# Patient Record
Sex: FEMALE | Race: WHITE | NOT HISPANIC OR LATINO | Employment: OTHER | URBAN - METROPOLITAN AREA
[De-identification: names, ages, dates, MRNs, and addresses within clinical notes are randomized per-mention and may not be internally consistent; named-entity substitution may affect disease eponyms.]

---

## 2017-01-09 ENCOUNTER — TELEPHONE (OUTPATIENT)
Dept: RHEUMATOLOGY | Facility: CLINIC | Age: 67
End: 2017-01-09

## 2017-01-09 NOTE — TELEPHONE ENCOUNTER
----- Message from Bonnie Avila sent at 1/9/2017 10:22 AM CST -----  Contact: [employer]  Calling to clarify pt script, Cosentyx, please call [phone].

## 2017-01-17 ENCOUNTER — PATIENT MESSAGE (OUTPATIENT)
Dept: RHEUMATOLOGY | Facility: CLINIC | Age: 67
End: 2017-01-17

## 2017-03-03 ENCOUNTER — TELEPHONE (OUTPATIENT)
Dept: RHEUMATOLOGY | Facility: CLINIC | Age: 67
End: 2017-03-03

## 2017-03-03 NOTE — TELEPHONE ENCOUNTER
----- Message from Immanuel Alcantara sent at 3/3/2017 11:22 AM CST -----  Contact: [facility] [employer] -[phone] ID#[id number]  Would like to verify prior authorization on Rx medication, please contact [employer] @ [phone].  Please call back @[phone] ID#[id number].  Thanks-[facility]

## 2017-03-22 ENCOUNTER — TELEPHONE (OUTPATIENT)
Dept: RHEUMATOLOGY | Facility: CLINIC | Age: 67
End: 2017-03-22

## 2017-03-22 NOTE — TELEPHONE ENCOUNTER
Spoke with Ms. Luis who states she received a shipment of her Cosentyx from [employer]. Follow up scheduled for 04/04/2017 with Zhanna Rollins PA-C. Reminder letter mailed.

## 2017-04-03 ENCOUNTER — TELEPHONE (OUTPATIENT)
Dept: RHEUMATOLOGY | Facility: CLINIC | Age: 67
End: 2017-04-03

## 2017-04-03 NOTE — TELEPHONE ENCOUNTER
----- Message from Margarette Abdalla sent at 4/3/2017 10:14 AM CDT -----  Contact: Pt  Pt is requesting to speak to the nurse regarding her appt that's scheduled on tomorrow. Pls call pt back at [phone] or [phone](hctu).

## 2017-04-06 ENCOUNTER — LAB VISIT (OUTPATIENT)
Dept: LAB | Facility: HOSPITAL | Age: 67
End: 2017-04-06
Attending: INTERNAL MEDICINE
Payer: MEDICARE

## 2017-04-06 ENCOUNTER — OFFICE VISIT (OUTPATIENT)
Dept: RHEUMATOLOGY | Facility: CLINIC | Age: 67
End: 2017-04-06
Payer: MEDICARE

## 2017-04-06 VITALS
DIASTOLIC BLOOD PRESSURE: 82 MMHG | HEART RATE: 90 BPM | WEIGHT: 153.44 LBS | SYSTOLIC BLOOD PRESSURE: 136 MMHG | BODY MASS INDEX: 24.03 KG/M2

## 2017-04-06 DIAGNOSIS — M54.2 NECK PAIN: ICD-10-CM

## 2017-04-06 DIAGNOSIS — Z51.81 MEDICATION MONITORING ENCOUNTER: Chronic | ICD-10-CM

## 2017-04-06 DIAGNOSIS — L40.50 PSORIATIC ARTHRITIS: ICD-10-CM

## 2017-04-06 DIAGNOSIS — L40.9 PSORIASIS: ICD-10-CM

## 2017-04-06 DIAGNOSIS — L40.50 PSORIATIC ARTHRITIS: Primary | ICD-10-CM

## 2017-04-06 DIAGNOSIS — M47.812 SPONDYLOSIS OF CERVICAL REGION WITHOUT MYELOPATHY OR RADICULOPATHY: ICD-10-CM

## 2017-04-06 DIAGNOSIS — E55.9 HYPOVITAMINOSIS D: Chronic | ICD-10-CM

## 2017-04-06 LAB
ALBUMIN SERPL BCP-MCNC: 3.8 G/DL
ALP SERPL-CCNC: 72 U/L
ALT SERPL W/O P-5'-P-CCNC: 23 U/L
ANION GAP SERPL CALC-SCNC: 11 MMOL/L
AST SERPL-CCNC: 27 U/L
BASOPHILS # BLD AUTO: 0.03 K/UL
BASOPHILS NFR BLD: 0.4 %
BILIRUB SERPL-MCNC: 0.3 MG/DL
BUN SERPL-MCNC: 16 MG/DL
CALCIUM SERPL-MCNC: 10 MG/DL
CHLORIDE SERPL-SCNC: 106 MMOL/L
CO2 SERPL-SCNC: 27 MMOL/L
CREAT SERPL-MCNC: 0.8 MG/DL
CRP SERPL-MCNC: 10.9 MG/L
DIFFERENTIAL METHOD: ABNORMAL
EOSINOPHIL # BLD AUTO: 0.1 K/UL
EOSINOPHIL NFR BLD: 2 %
ERYTHROCYTE [DISTWIDTH] IN BLOOD BY AUTOMATED COUNT: 14 %
ERYTHROCYTE [SEDIMENTATION RATE] IN BLOOD BY WESTERGREN METHOD: 10 MM/HR
EST. GFR  (AFRICAN AMERICAN): >60 ML/MIN/1.73 M^2
EST. GFR  (NON AFRICAN AMERICAN): >60 ML/MIN/1.73 M^2
GLUCOSE SERPL-MCNC: 108 MG/DL
HCT VFR BLD AUTO: 40.4 %
HGB BLD-MCNC: 13.3 G/DL
LYMPHOCYTES # BLD AUTO: 1.9 K/UL
LYMPHOCYTES NFR BLD: 27.6 %
MCH RBC QN AUTO: 30.4 PG
MCHC RBC AUTO-ENTMCNC: 32.9 %
MCV RBC AUTO: 92 FL
MONOCYTES # BLD AUTO: 0.3 K/UL
MONOCYTES NFR BLD: 3.6 %
NEUTROPHILS # BLD AUTO: 4.6 K/UL
NEUTROPHILS NFR BLD: 66.4 %
PLATELET # BLD AUTO: 224 K/UL
PMV BLD AUTO: 11.3 FL
POTASSIUM SERPL-SCNC: 4.1 MMOL/L
PROT SERPL-MCNC: 7.4 G/DL
RBC # BLD AUTO: 4.38 M/UL
SODIUM SERPL-SCNC: 144 MMOL/L
WBC # BLD AUTO: 6.91 K/UL

## 2017-04-06 PROCEDURE — 99214 OFFICE O/P EST MOD 30 MIN: CPT | Mod: S$GLB,,, | Performed by: PHYSICIAN ASSISTANT

## 2017-04-06 PROCEDURE — 1160F RVW MEDS BY RX/DR IN RCRD: CPT | Mod: S$GLB,,, | Performed by: PHYSICIAN ASSISTANT

## 2017-04-06 PROCEDURE — 99999 PR PBB SHADOW E&M-EST. PATIENT-LVL III: CPT | Mod: PBBFAC,,, | Performed by: PHYSICIAN ASSISTANT

## 2017-04-06 RX ORDER — METHYLPREDNISOLONE 4 MG/1
TABLET ORAL
Qty: 1 PACKAGE | Refills: 1 | Status: SHIPPED | OUTPATIENT
Start: 2017-04-06 | End: 2017-08-16

## 2017-04-06 ASSESSMENT — CLINICAL DISEASE ACTIVITY INDEX (CDAI)
PHYSICIAN_ASSESSMENT: 1
TOTAL_SCORE: 6
TENDER_JOINTS_COUNT: 2
PATIENT_ASSESSMENT: 1
SWOLLEN_JOINTS_COUNT: 2

## 2017-04-06 NOTE — PROGRESS NOTES
Subjective:       Patient ID: Karin Smith is a 66 y.o. female.    Chief Complaint: Psoriatic Arthritis and Fibromyalgia    HPI Comments: Karin is seen here for Chronic Psoriatic arthritis with skin psoriasis, severe resorptive type.  She has failed  humira, enbrel, remicade, simponi, mtx, stelara, and cimzia. She was  moved to cosentyx 150mg in October and is doing well from a joint and rash standpoint. She has tolerates the Cosentyx well and has no issues.She has a few small spots of psoriasis rash on her scalp mostly today.  She feels like the Cosentyx has done a good job.      Her main complaint today is neck muscle spasms.  Last Friday she somehow pulled a muscle in her neck on the left.  Denies weakness, numbness or tingling. She went to the ER and they did a CT which did not show any acute abnormality. She is taking muscle relaxers and ibuprofen but has not had significant relief.  She was told if she did not improve she may need an MRI. She still can't look over her left shoulder due to pain and stiffness.  She has been using heat and ice.  She has a h/o disc disease in her neck and has had surgery years ago.      History of vit D deficiency, on vit D 50,000Units weekly.         Fibromyalgia   Associated symptoms include arthralgias, joint swelling, neck pain and a rash. Pertinent negatives include no chest pain, coughing, fatigue, fever, headaches, myalgias, nausea, vomiting or weakness.     Review of Systems   Constitutional: Negative for activity change, fatigue, fever and unexpected weight change.   HENT: Negative for mouth sores, tinnitus and trouble swallowing.    Eyes: Negative for itching and visual disturbance.   Respiratory: Negative for cough and shortness of breath.    Cardiovascular: Negative for chest pain, palpitations and leg swelling.   Gastrointestinal: Negative for blood in stool, diarrhea, nausea and vomiting.   Genitourinary: Negative for dysuria, flank pain, frequency and hematuria.    Musculoskeletal: Positive for arthralgias, back pain, joint swelling, neck pain and neck stiffness. Negative for myalgias.   Skin: Positive for rash. Negative for wound.   Neurological: Negative for dizziness, weakness, light-headedness and headaches.   Hematological: Negative for adenopathy. Does not bruise/bleed easily.        Increased urinary frequency   Psychiatric/Behavioral: Negative for confusion and sleep disturbance.         Objective:     /82  Pulse 90  Wt 69.6 kg (153 lb 7 oz)  BMI 24.03 kg/m2     Physical Exam   Constitutional: She is oriented to person, place, and time and well-developed, well-nourished, and in no distress. No distress.   HENT:   Head: Normocephalic.   Eyes: EOM are normal. Pupils are equal, round, and reactive to light.   Neck: Normal range of motion. Neck supple. No thyromegaly present.   Cardiovascular: Normal rate, regular rhythm and normal heart sounds.  Exam reveals no gallop and no friction rub.    No murmur heard.  Pulmonary/Chest: Breath sounds normal. She has no wheezes. She has no rales. She exhibits no tenderness.   Abdominal: Soft. There is no tenderness. There is no rebound.       Right Side Rheumatological Exam     Examination finds the shoulder, elbow, wrist, knee, 1st MCP, 2nd PIP, 2nd MCP, 3rd MCP, 4th PIP, 4th MCP, 5th PIP and 5th MCP normal.    The patient is tender to palpation of the 2nd PIP    She has swelling of the 2nd PIP    The patient has an enlarged 1st PIP and 3rd PIP    Hip Exam   Tenderness Location: no tenderness    Left Side Rheumatological Exam     Examination finds the shoulder, elbow, wrist, knee, 1st MCP, 2nd MCP, 3rd MCP, 4th PIP, 4th MCP, 5th PIP and 5th MCP normal.    The patient is tender to palpation of the 2nd PIP.    She has swelling of the 2nd PIP    The patient has an enlarged 1st PIP.      Lymphadenopathy:     She has no cervical adenopathy.   Neurological: She is alert and oriented to person, place, and time. She displays  normal reflexes. She exhibits normal muscle tone. Gait normal.   Skin: Skin is warm and dry. Rash noted. No erythema.     Few small spots of psoriasis rash at her hairline posterior scalp and neck   Psychiatric: Mood, memory and affect normal.   Musculoskeletal: Normal range of motion. She exhibits no edema or tenderness.   Chronic damage noted to clay pips, dips, with swelling and mild tenderness to right 2 pip and left 2 pip    Dip enlargement to right 1,4,5 digit  Left 1,2,3,5 dip enlargement      Cervical motion sig decreased due to pain, mild tenderness throughout paraspinal musculature on the left side    C5,6,7 strength 5/5           Recent Results (from the past 168 hour(s))   Comprehensive metabolic panel    Collection Time: 04/06/17  2:10 PM   Result Value Ref Range    Sodium 144 136 - 145 mmol/L    Potassium 4.1 3.5 - 5.1 mmol/L    Chloride 106 95 - 110 mmol/L    CO2 27 23 - 29 mmol/L    Glucose 108 70 - 110 mg/dL    BUN, Bld 16 8 - 23 mg/dL    Creatinine 0.8 0.5 - 1.4 mg/dL    Calcium 10.0 8.7 - 10.5 mg/dL    Total Protein 7.4 6.0 - 8.4 g/dL    Albumin 3.8 3.5 - 5.2 g/dL    Total Bilirubin 0.3 0.1 - 1.0 mg/dL    Alkaline Phosphatase 72 55 - 135 U/L    AST 27 10 - 40 U/L    ALT 23 10 - 44 U/L    Anion Gap 11 8 - 16 mmol/L    eGFR if African American >60 >60 mL/min/1.73 m^2    eGFR if non African American >60 >60 mL/min/1.73 m^2   CBC auto differential    Collection Time: 04/06/17  2:10 PM   Result Value Ref Range    WBC 6.91 3.90 - 12.70 K/uL    RBC 4.38 4.00 - 5.40 M/uL    Hemoglobin 13.3 12.0 - 16.0 g/dL    Hematocrit 40.4 37.0 - 48.5 %    MCV 92 82 - 98 fL    MCH 30.4 27.0 - 31.0 pg    MCHC 32.9 32.0 - 36.0 %    RDW 14.0 11.5 - 14.5 %    Platelets 224 150 - 350 K/uL    MPV 11.3 9.2 - 12.9 fL    Gran # 4.6 1.8 - 7.7 K/uL    Lymph # 1.9 1.0 - 4.8 K/uL    Mono # 0.3 0.3 - 1.0 K/uL    Eos # 0.1 0.0 - 0.5 K/uL    Baso # 0.03 0.00 - 0.20 K/uL    Gran% 66.4 38.0 - 73.0 %    Lymph% 27.6 18.0 - 48.0 %    Mono%  3.6 (L) 4.0 - 15.0 %    Eosinophil% 2.0 0.0 - 8.0 %    Basophil% 0.4 0.0 - 1.9 %    Differential Method Automated           Assessment:       1. Psoriatic arthritis    2. Psoriasis    3. Spondylosis of cervical region without myelopathy or radiculopathy    4. Hypovitaminosis D    5. Medication monitoring encounter    6. Neck pain        1  Psoriatic arthritis/Psoriasis: severe resorptive type of PsA;  failed in the past mtx, humira, enbrel, remicade, simponi, stelara, cimzia.  Currently on Cosentyx 150mg monthly. cdai 6, low disease activity; uses topical for rash  2.  Cervical Degenerative Disc disease:  history of surgery on her neck   3. Acute neck pain: pulled muscle per ER ([facility]), negative CT scan, pain persists despite muscle relaxers, ibuprofen  4. Medication monitoring; no issues with toxicity  5. Low vit D: weekly vit D 50,000Units  6. Immunocompromised: utd except zoster  Plan:          Continue cosentyx 150mg monthly  Medrol Dose pack to help with neck pain, if no improvement or worsens, or weakness or numbness/tingling then will need MRI; also consider physical therapy   Cont vit D weekly, will check level next visit  She will let us know how she is feeling next week with her neck    Return in 4 months with reg 4 labs and vit D: rtc in 8 mos with dr. matias

## 2017-04-06 NOTE — PATIENT INSTRUCTIONS
No change with cosentyx    Medrol dose pack for neck pain    Let us know Monday if no better, may need PT/mri

## 2017-04-06 NOTE — MR AVS SNAPSHOT
[facility]  [address] [place] [address]  Phone: [phone]  Fax: [phone]                  Karin Smith   2017 3:00 PM   Office Visit    Description:  Female : 1950   Provider:  Zhanna Rollins PA-C   Department:  [facility]           Reason for Visit     Psoriatic Arthritis     Fibromyalgia           Diagnoses this Visit        Comments    Psoriatic arthritis    -  Primary     Psoriasis         Spondylosis of cervical region without myelopathy or radiculopathy         Hypovitaminosis D         Medication monitoring encounter         Neck pain                To Do List           Future Appointments        Provider Department Dept Phone    2017 9:00 AM Zhanna Rollins PA-C [facility] [phone]    2017 9:45 AM LABORATORY, [facility] [phone]    2017 9:30 AM LABORATORY, [facility] [phone]    2017 10:00 AM Daniel Ortega MD [facility] [phone]      Goals (5 Years of Data)     None      Follow-Up and Disposition     Return in about 4 months (around 2017) for me, vit d, reg 4 ; 8 mos polly, reg 4.       These Medications        Disp Refills Start End    methylPREDNISolone (MEDROL DOSEPACK) 4 mg tablet 1 Package 1 2017     use as directed    [facility] [place] [facility] [address] [place] [place] Ph #: [phone]         [facility]     [facility] Care [facility] - 24/ Assistance  Unless otherwise directed by your provider, please contact [facility], our nurse care line that is available for 24/7 assistance.     Registered nurses in the [facility] provide: appointment scheduling, clinical advisement, health education, and other advisory services.  Call: [phone] (toll free)               Medications           Message regarding Medications     Verify the changes and/or additions to your medication  regime listed below are the same as discussed with your clinician today.  If any of these changes or additions are incorrect, please notify your healthcare provider.             Verify that the below list of medications is an accurate representation of the medications you are currently taking.  If none reported, the list may be blank. If incorrect, please contact your healthcare provider. Carry this list with you in case of emergency.           Current Medications     acetaminophen (TYLENOL) 500 MG tablet Take 500 mg by mouth every 6 (six) hours as needed for Pain.    cyclobenzaprine (FLEXERIL) 10 MG tablet Take one pill at nighttime and a half a pill in the morning    dicyclomine (BENTYL) 20 mg tablet Take 20 mg by mouth 3 (three) times daily.    estradiol (ESTRACE) 1 MG tablet Take 1 mg by mouth once daily.    levothyroxine (SYNTHROID) 112 MCG tablet Take 112 mcg by mouth.    lisinopril (PRINIVIL,ZESTRIL) 20 MG tablet     pantoprazole (PROTONIX) 40 MG tablet Take 40 mg by mouth once daily.    secukinumab 150 mg/mL Syrg Inject 150 mg into the skin every 28 days.    diclofenac sodium (VOLTAREN) 1 % Gel Apply 2 g topically 4 (four) times daily.    ergocalciferol (ERGOCALCIFEROL) 50,000 unit Cap Take 1 capsule (50,000 Units total) by mouth every 7 days.    gabapentin (NEURONTIN) 300 MG capsule Take 1 capsule (300 mg total) by mouth 3 (three) times daily.    methylPREDNISolone (MEDROL DOSEPACK) 4 mg tablet use as directed    tramadol (ULTRAM) 50 mg tablet Take 1 tablet (50 mg total) by mouth every 6 (six) hours as needed for Pain.           Clinical Reference Information           Your Vitals Were     BP Pulse Weight BMI       136/82 90 69.6 kg (153 lb 7 oz) 24.03 kg/m2       Blood Pressure          Most Recent Value    BP  136/82      Allergies as of 4/6/2017     Benadryl [Diphenhydramine Hcl]      Immunizations Administered on Date of Encounter - 4/6/2017     None      Orders Placed During Today's Visit     Recurring  Lab Work Interval Expires    C-reactive protein   4/6/2018    CBC auto differential   4/6/2018    Comprehensive metabolic panel   4/6/2018    Sedimentation rate, manual   4/6/2018    Vitamin D   6/5/2018      Instructions    No change with cosentyx    Medrol dose pack for neck pain    Let us know Monday if no better, may need PT/mri       Language Assistance Services     ATTENTION: Language assistance services are available, free of charge. Please call [phone].      ATENCIÓN: Si habla jeannie, tiene a rush disposición servicios gratuitos de asistencia lingüística. Llame al [phone].     CHÚ Ý: N?u b?n nói Ti?ng Vi?t, có các d?ch v? h? tr? ngôn ng? mi?n phí dành cho b?n. G?i s? [phone].         [facility] complies with applicable Federal civil rights laws and does not discriminate on the basis of race, color, national origin, age, disability, or sex.

## 2017-06-29 DIAGNOSIS — L40.50 PSORIATIC ARTHRITIS: ICD-10-CM

## 2017-06-29 NOTE — TELEPHONE ENCOUNTER
----- Message from Charly Zazueta sent at 6/29/2017  3:18 PM CDT -----  Pt is requesting a call from nurse to discuss her medications.          Please call pt back at [phone] ( home ) [phone] (cell)

## 2017-06-29 NOTE — TELEPHONE ENCOUNTER
Spoke with Ms. Smith who states that she has to receive her Cosentyx from [facility] because she is no longer eligible to receive assistance from [employer]. Please send generic prescription which would be cheaper for patient if you believe it is just as good as the brand name per patient's request.

## 2017-07-10 ENCOUNTER — TELEPHONE (OUTPATIENT)
Dept: RHEUMATOLOGY | Facility: CLINIC | Age: 67
End: 2017-07-10

## 2017-07-10 NOTE — TELEPHONE ENCOUNTER
Spoke with Stacie at [facility] and provided patient's [facility] ID number.

## 2017-07-10 NOTE — TELEPHONE ENCOUNTER
----- Message from Paige Brooks sent at 7/10/2017  9:31 AM CDT -----  Contact: Cecille with [employer] [phone]  Calling concerning an ID# of [employer] in order to fill RX for patient. Please call Cecille @ [phone]

## 2017-07-31 ENCOUNTER — PATIENT MESSAGE (OUTPATIENT)
Dept: RHEUMATOLOGY | Facility: CLINIC | Age: 67
End: 2017-07-31

## 2017-08-16 ENCOUNTER — OFFICE VISIT (OUTPATIENT)
Dept: RHEUMATOLOGY | Facility: CLINIC | Age: 67
End: 2017-08-16
Payer: MEDICARE

## 2017-08-16 ENCOUNTER — HOSPITAL ENCOUNTER (OUTPATIENT)
Dept: RADIOLOGY | Facility: HOSPITAL | Age: 67
Discharge: HOME | End: 2017-08-16
Attending: PHYSICIAN ASSISTANT
Payer: MEDICARE

## 2017-08-16 VITALS
SYSTOLIC BLOOD PRESSURE: 118 MMHG | DIASTOLIC BLOOD PRESSURE: 77 MMHG | BODY MASS INDEX: 23.72 KG/M2 | WEIGHT: 151.44 LBS | HEART RATE: 91 BPM

## 2017-08-16 DIAGNOSIS — L40.50 PSORIATIC ARTHRITIS: ICD-10-CM

## 2017-08-16 DIAGNOSIS — Z51.81 MEDICATION MONITORING ENCOUNTER: Chronic | ICD-10-CM

## 2017-08-16 DIAGNOSIS — E55.9 HYPOVITAMINOSIS D: Chronic | ICD-10-CM

## 2017-08-16 DIAGNOSIS — L40.50 PSORIATIC ARTHRITIS: Primary | ICD-10-CM

## 2017-08-16 DIAGNOSIS — M47.812 SPONDYLOSIS OF CERVICAL REGION WITHOUT MYELOPATHY OR RADICULOPATHY: ICD-10-CM

## 2017-08-16 DIAGNOSIS — D84.9 IMMUNOCOMPROMISED: ICD-10-CM

## 2017-08-16 PROCEDURE — 73130 X-RAY EXAM OF HAND: CPT | Mod: 26,50,, | Performed by: RADIOLOGY

## 2017-08-16 PROCEDURE — 73130 X-RAY EXAM OF HAND: CPT | Mod: 50,TC,PO

## 2017-08-16 PROCEDURE — 72040 X-RAY EXAM NECK SPINE 2-3 VW: CPT | Mod: 26,,, | Performed by: RADIOLOGY

## 2017-08-16 PROCEDURE — 99999 PR PBB SHADOW E&M-EST. PATIENT-LVL IV: CPT | Mod: PBBFAC,,, | Performed by: PHYSICIAN ASSISTANT

## 2017-08-16 PROCEDURE — 72040 X-RAY EXAM NECK SPINE 2-3 VW: CPT | Mod: TC,PO

## 2017-08-16 PROCEDURE — 3008F BODY MASS INDEX DOCD: CPT | Mod: S$GLB,,, | Performed by: PHYSICIAN ASSISTANT

## 2017-08-16 PROCEDURE — 99214 OFFICE O/P EST MOD 30 MIN: CPT | Mod: S$GLB,,, | Performed by: PHYSICIAN ASSISTANT

## 2017-08-16 ASSESSMENT — CLINICAL DISEASE ACTIVITY INDEX (CDAI)
PHYSICIAN_ASSESSMENT: 5
TENDER_JOINTS_COUNT: 3
SWOLLEN_JOINTS_COUNT: 3
PATIENT_ASSESSMENT: 6
TOTAL_SCORE: 17

## 2017-08-16 NOTE — PATIENT INSTRUCTIONS
If vit d normal 1000units day     Considering mtx injectable versus increase cosentyx    xrays today

## 2017-08-16 NOTE — PROGRESS NOTES
Subjective:       Patient ID: Karin Smith is a 66 y.o. female.    Chief Complaint: Psoriatic Arthritis    Karin is seen here for Chronic Psoriatic arthritis with skin psoriasis, severe resorptive type.  She has failed  humira, enbrel, remicade, simponi, mtx (GI upset), stelara, and cimzia ($$). She was  moved to cosentyx 150mg in October 2016 and has doing well until a few months ago. She tolerates the Cosentyx well and has no issues with this.  She feels like the Cosentyx had been doing a good job but for the last few months she has had increased joint pain and swelling mostly in her right index finger and left 2,3, digit.   Swelling and tenderness to her pip and dips.Increase in her rash as well over the last few months.  Rash noted to her scalp, clay forearms, clay feet.  She also has pain in her right arm, starting in her neck/shoulder, she does have cervical degenerative arthritis with disc disease.  Her pain starts around her neck, radiates down her shoulder, elbow and into her hand. Her pain today is a 6/10 in her right arm and fingers.     History of vit D deficiency, on vit D 50,000Units weekly.           Fibromyalgia   Associated symptoms include arthralgias, joint swelling, neck pain and a rash. Pertinent negatives include no chest pain, coughing, fatigue, fever, headaches, myalgias, nausea, vomiting or weakness.     Review of Systems   Constitutional: Negative for activity change, fatigue, fever and unexpected weight change.   HENT: Negative for mouth sores, tinnitus and trouble swallowing.    Eyes: Negative for itching and visual disturbance.   Respiratory: Negative for cough and shortness of breath.    Cardiovascular: Negative for chest pain, palpitations and leg swelling.   Gastrointestinal: Negative for blood in stool, diarrhea, nausea and vomiting.   Genitourinary: Negative for dysuria, flank pain, frequency and hematuria.   Musculoskeletal: Positive for arthralgias, back pain, joint swelling, neck  pain and neck stiffness. Negative for myalgias.   Skin: Positive for rash. Negative for wound.   Neurological: Negative for dizziness, weakness, light-headedness and headaches.   Hematological: Negative for adenopathy. Does not bruise/bleed easily.        Increased urinary frequency   Psychiatric/Behavioral: Negative for confusion and sleep disturbance.         Objective:     /77   Pulse 91   Wt 68.7 kg (151 lb 7.3 oz)   BMI 23.72 kg/m²      Physical Exam   Constitutional: She is oriented to person, place, and time and well-developed, well-nourished, and in no distress. No distress.   HENT:   Head: Normocephalic.   Eyes: EOM are normal. Pupils are equal, round, and reactive to light.   Neck: Normal range of motion. Neck supple. No thyromegaly present.   Cardiovascular: Normal rate, regular rhythm and normal heart sounds.  Exam reveals no gallop and no friction rub.    No murmur heard.  Pulmonary/Chest: Breath sounds normal. She has no wheezes. She has no rales. She exhibits no tenderness.   Abdominal: Soft. There is no tenderness. There is no rebound.       Right Side Rheumatological Exam     The patient is tender to palpation of the 2nd PIP    She has swelling of the 2nd PIP    Hip Exam   Tenderness Location: no tenderness    Left Side Rheumatological Exam     The patient is tender to palpation of the 2nd PIP and 3rd PIP.    She has swelling of the 2nd PIP and 3rd PIP      Lymphadenopathy:     She has no cervical adenopathy.   Neurological: She is alert and oriented to person, place, and time. She displays normal reflexes. She exhibits normal muscle tone. Gait normal.   Skin: Skin is warm and dry. Rash noted. No erythema.     Psoriasis rash noted to her scalp,bilateral forearms and feet.    Psychiatric: Mood, memory and affect normal.   Musculoskeletal: Normal range of motion. She exhibits tenderness. She exhibits no edema.   Chronic damage noted to clay pips, dips,  Dip enlargement to right 1,4,5 digit, 3  pip   Left 1,2,3,5 dip enlargement 4, 5 pip    Swelling and tenderness to Right 2 pip and dip and left 2,3, pip and dip    +Enthesitis to her clay elbow lateral epicondyles             Recent Results (from the past 168 hour(s))   CBC auto differential    Collection Time: 08/16/17  1:33 PM   Result Value Ref Range    WBC 6.63 3.90 - 12.70 K/uL    RBC 4.25 4.00 - 5.40 M/uL    Hemoglobin 13.3 12.0 - 16.0 g/dL    Hematocrit 39.8 37.0 - 48.5 %    MCV 94 82 - 98 fL    MCH 31.3 (H) 27.0 - 31.0 pg    MCHC 33.4 32.0 - 36.0 g/dL    RDW 13.5 11.5 - 14.5 %    Platelets 252 150 - 350 K/uL    MPV 10.8 9.2 - 12.9 fL    Gran # 4.3 1.8 - 7.7 K/uL    Lymph # 1.9 1.0 - 4.8 K/uL    Mono # 0.3 0.3 - 1.0 K/uL    Eos # 0.1 0.0 - 0.5 K/uL    Baso # 0.02 0.00 - 0.20 K/uL    Gran% 64.9 38.0 - 73.0 %    Lymph% 28.5 18.0 - 48.0 %    Mono% 4.5 4.0 - 15.0 %    Eosinophil% 1.8 0.0 - 8.0 %    Basophil% 0.3 0.0 - 1.9 %    Differential Method Automated    Comprehensive metabolic panel    Collection Time: 08/16/17  1:33 PM   Result Value Ref Range    Sodium 139 136 - 145 mmol/L    Potassium 4.1 3.5 - 5.1 mmol/L    Chloride 102 95 - 110 mmol/L    CO2 25 23 - 29 mmol/L    Glucose 96 70 - 110 mg/dL    BUN, Bld 14 8 - 23 mg/dL    Creatinine 0.8 0.5 - 1.4 mg/dL    Calcium 10.0 8.7 - 10.5 mg/dL    Total Protein 7.6 6.0 - 8.4 g/dL    Albumin 3.8 3.5 - 5.2 g/dL    Total Bilirubin 0.3 0.1 - 1.0 mg/dL    Alkaline Phosphatase 62 55 - 135 U/L    AST 19 10 - 40 U/L    ALT 11 10 - 44 U/L    Anion Gap 12 8 - 16 mmol/L    eGFR if African American >60 >60 mL/min/1.73 m^2    eGFR if non African American >60 >60 mL/min/1.73 m^2   Sedimentation rate, manual    Collection Time: 08/16/17  1:33 PM   Result Value Ref Range    Sed Rate 4 0 - 20 mm/Hr                  Assessment:       1. Psoriatic arthritis    2. Spondylosis of cervical region without myelopathy or radiculopathy    3. Medication monitoring encounter    4. Hypovitaminosis D    5. Immunocompromised         Impression:    Psoriatic arthritis/Psoriasis: severe resorptive type of PsA;  Mod disease activity with +enthesitis, swollen and tender joints; cdai 17, failed in the past mtx (GI upset), humira, enbrel, remicade, simponi, stelara(one dose ineffective), cimzia ($).  Currently on Cosentyx 150mg monthly.;  Psoriasis: active rash on exam today, scalp, clay forearms, clay feet; also using topical   Cervical Degenerative Disc disease:  history of surgery on her neck, likely with radiation into right arm   Medication monitoring; no issues with toxicity  Low vit D: weekly vit D 50,000Units, new level being checked today   Immunocompromised: utd except zoster  Plan:       Will increase cosentyx to 300mg SC monthly due to active psoriasis rash  Check vit D level, may be able to switch to otc vit D 1000units  Xray bilateral hand today check for progression of erosions, and xray c spine    Return in 4 months with reg 4 labs, sees Dr. Ortega in December.    Case discussed with Dr Ortega. Assessment and Plan done in collaboration    Hx and exam reviewed  with Mrs Rollins, helped develop  Imp/Plan as above and discussed same with Diff. Dx considered. Best move is to increase Cosentyx at this time DIONNE Ortega MD

## 2017-12-11 ENCOUNTER — LAB VISIT (OUTPATIENT)
Dept: LAB | Facility: HOSPITAL | Age: 67
End: 2017-12-11
Attending: INTERNAL MEDICINE
Payer: MEDICARE

## 2017-12-11 ENCOUNTER — OFFICE VISIT (OUTPATIENT)
Dept: RHEUMATOLOGY | Facility: CLINIC | Age: 67
End: 2017-12-11
Payer: MEDICARE

## 2017-12-11 VITALS
DIASTOLIC BLOOD PRESSURE: 86 MMHG | HEIGHT: 67 IN | WEIGHT: 156.06 LBS | HEART RATE: 75 BPM | BODY MASS INDEX: 24.49 KG/M2 | SYSTOLIC BLOOD PRESSURE: 157 MMHG

## 2017-12-11 DIAGNOSIS — M47.812 SPONDYLOSIS OF CERVICAL REGION WITHOUT MYELOPATHY OR RADICULOPATHY: Primary | Chronic | ICD-10-CM

## 2017-12-11 DIAGNOSIS — L40.50 PSORIATIC ARTHRITIS: ICD-10-CM

## 2017-12-11 DIAGNOSIS — L40.9 PSORIASIS: ICD-10-CM

## 2017-12-11 DIAGNOSIS — M54.2 NECK PAIN: ICD-10-CM

## 2017-12-11 DIAGNOSIS — Z51.81 MEDICATION MONITORING ENCOUNTER: Chronic | ICD-10-CM

## 2017-12-11 DIAGNOSIS — D84.9 IMMUNOCOMPROMISED: ICD-10-CM

## 2017-12-11 LAB
ALBUMIN SERPL BCP-MCNC: 3.7 G/DL
ALP SERPL-CCNC: 56 U/L
ALT SERPL W/O P-5'-P-CCNC: 11 U/L
ANION GAP SERPL CALC-SCNC: 11 MMOL/L
AST SERPL-CCNC: 19 U/L
BASOPHILS # BLD AUTO: 0.02 K/UL
BASOPHILS NFR BLD: 0.4 %
BILIRUB SERPL-MCNC: 0.3 MG/DL
BUN SERPL-MCNC: 11 MG/DL
CALCIUM SERPL-MCNC: 9.3 MG/DL
CHLORIDE SERPL-SCNC: 104 MMOL/L
CO2 SERPL-SCNC: 24 MMOL/L
CREAT SERPL-MCNC: 0.8 MG/DL
CRP SERPL-MCNC: 5.1 MG/L
DIFFERENTIAL METHOD: NORMAL
EOSINOPHIL # BLD AUTO: 0.1 K/UL
EOSINOPHIL NFR BLD: 2.2 %
ERYTHROCYTE [DISTWIDTH] IN BLOOD BY AUTOMATED COUNT: 14.2 %
ERYTHROCYTE [SEDIMENTATION RATE] IN BLOOD BY WESTERGREN METHOD: 5 MM/HR
EST. GFR  (AFRICAN AMERICAN): >60 ML/MIN/1.73 M^2
EST. GFR  (NON AFRICAN AMERICAN): >60 ML/MIN/1.73 M^2
GLUCOSE SERPL-MCNC: 106 MG/DL
HCT VFR BLD AUTO: 38.6 %
HGB BLD-MCNC: 12.5 G/DL
LYMPHOCYTES # BLD AUTO: 1.3 K/UL
LYMPHOCYTES NFR BLD: 24.5 %
MCH RBC QN AUTO: 30.3 PG
MCHC RBC AUTO-ENTMCNC: 32.4 G/DL
MCV RBC AUTO: 94 FL
MONOCYTES # BLD AUTO: 0.3 K/UL
MONOCYTES NFR BLD: 5.3 %
NEUTROPHILS # BLD AUTO: 3.7 K/UL
NEUTROPHILS NFR BLD: 67.6 %
PLATELET # BLD AUTO: 214 K/UL
PMV BLD AUTO: 11.1 FL
POTASSIUM SERPL-SCNC: 4.7 MMOL/L
PROT SERPL-MCNC: 7 G/DL
RBC # BLD AUTO: 4.12 M/UL
SODIUM SERPL-SCNC: 139 MMOL/L
WBC # BLD AUTO: 5.46 K/UL

## 2017-12-11 PROCEDURE — 85025 COMPLETE CBC W/AUTO DIFF WBC: CPT | Mod: PO

## 2017-12-11 PROCEDURE — 99214 OFFICE O/P EST MOD 30 MIN: CPT | Mod: S$GLB,,, | Performed by: INTERNAL MEDICINE

## 2017-12-11 PROCEDURE — 99999 PR PBB SHADOW E&M-EST. PATIENT-LVL IV: CPT | Mod: PBBFAC,,, | Performed by: INTERNAL MEDICINE

## 2017-12-11 PROCEDURE — 85651 RBC SED RATE NONAUTOMATED: CPT | Mod: PO

## 2017-12-11 PROCEDURE — 36415 COLL VENOUS BLD VENIPUNCTURE: CPT | Mod: PO

## 2017-12-11 PROCEDURE — 80053 COMPREHEN METABOLIC PANEL: CPT | Mod: PO

## 2017-12-11 PROCEDURE — 86140 C-REACTIVE PROTEIN: CPT

## 2017-12-11 RX ORDER — MELOXICAM 15 MG/1
15 TABLET ORAL DAILY
Qty: 30 TABLET | Refills: 5 | Status: SHIPPED | OUTPATIENT
Start: 2017-12-11 | End: 2024-03-11

## 2017-12-11 RX ORDER — METHYLPREDNISOLONE 4 MG/1
TABLET ORAL
Qty: 1 PACKAGE | Refills: 0 | Status: SHIPPED | OUTPATIENT
Start: 2017-12-11 | End: 2018-01-01

## 2017-12-11 RX ORDER — ESCITALOPRAM OXALATE 10 MG/1
10 TABLET ORAL
COMMUNITY
Start: 2017-11-08 | End: 2018-02-06

## 2017-12-11 ASSESSMENT — CLINICAL DISEASE ACTIVITY INDEX (CDAI)
TOTAL_SCORE: 13
TENDER_JOINTS_COUNT: 4
PHYSICIAN_ASSESSMENT: 3
PATIENT_ASSESSMENT: 2
SWOLLEN_JOINTS_COUNT: 4

## 2017-12-11 ASSESSMENT — ROUTINE ASSESSMENT OF PATIENT INDEX DATA (RAPID3): MDHAQ FUNCTION SCORE: .7

## 2017-12-11 NOTE — PROGRESS NOTES
Hx reviewed personally  with pt. in room, examined pt.'s joints and pertinent organ areas in room, developed  Imp/Plan as follows and discussed same with patient and/or family.  PSA is better as is rash on Cosentyx 300 mg q 28 days. R shoulder sub acr. Pain seems likely referred since ROM of shoulder is normal and don't increase pain much with motion- will try Medrol pack and PT

## 2017-12-11 NOTE — PROGRESS NOTES
Subjective:       Patient ID: Karin Smith is a 67 y.o. female.    Chief Complaint: Psoriatic Arthritis and Pain    Karin is seen here for Chronic Psoriatic arthritis with skin psoriasis, severe resorptive type.  She has failed  humira, enbrel, remicade, simponi, mtx (GI upset), stelara, and cimzia ($$). She was  moved to cosentyx 150mg in October 2016 and has doing well until a few months ago. During last visit in August 2017, patient noted to have psoriatic rash and well as active disease. Cosentyx was increased to 300 mg. Reports that after each dose of Consentyx, she gets a migraine headache that is relieved right after she takes two extra strength tylenol. She feels that Cosentyx has been doing a good job overall. She has pain and swelling in the right index finger which is chronic but now has developed worsening pain and swelling in the left index finger. Also has pain in the left thumb and left 3rd digit. Her psoriatric rash which was present on her scalp and feet has now gone away.   Has chronic neck pain and now has developed pain in the right arm arm for the past month. The pain remains in the right upper arm and does not radiate. She has no numbness/change of sensation in the right arm or hand. Takes mobic 15 mg only as needed and not daily.             Fibromyalgia   Associated symptoms include arthralgias, joint swelling, neck pain and a rash. Pertinent negatives include no chest pain, coughing, fatigue, fever, headaches, myalgias, nausea, vomiting or weakness.           She complains of joint swelling. Pertinent negatives include no dysuria, fatigue, fever, trouble swallowing, myalgias or headaches.         Review of Systems   Constitutional: Negative for activity change, fatigue, fever and unexpected weight change.   HENT: Negative for mouth sores, tinnitus and trouble swallowing.    Eyes: Negative for itching and visual disturbance.   Respiratory: Negative for cough and shortness of breath.   "  Cardiovascular: Negative for chest pain, palpitations and leg swelling.   Gastrointestinal: Negative for blood in stool, diarrhea, nausea and vomiting.   Genitourinary: Negative for dysuria, flank pain, frequency and hematuria.   Musculoskeletal: Positive for arthralgias, back pain, joint swelling, neck pain and neck stiffness. Negative for myalgias.   Skin: Positive for rash. Negative for wound.   Neurological: Negative for dizziness, weakness, light-headedness and headaches.   Hematological: Negative for adenopathy. Does not bruise/bleed easily.        Increased urinary frequency   Psychiatric/Behavioral: Negative for confusion and sleep disturbance.         Objective:     BP (!) 157/86   Pulse 75   Ht 5' 7" (1.702 m)   Wt 70.8 kg (156 lb 1.4 oz)   BMI 24.45 kg/m²      Physical Exam   Constitutional: She is oriented to person, place, and time and well-developed, well-nourished, and in no distress. No distress.   HENT:   Head: Normocephalic.   Eyes: EOM are normal. Pupils are equal, round, and reactive to light.   Neck: Normal range of motion. Neck supple. No thyromegaly present.   Cardiovascular: Normal rate, regular rhythm and normal heart sounds.  Exam reveals no gallop and no friction rub.    No murmur heard.  Pulmonary/Chest: Breath sounds normal. She has no wheezes. She has no rales. She exhibits no tenderness.   Abdominal: Soft. There is no tenderness. There is no rebound.       Right Side Rheumatological Exam     The patient is tender to palpation of the shoulder and 2nd PIP    She has swelling of the 2nd PIP    Shoulder Exam   Tenderness Location: subacromion    Range of Motion   Active Abduction: normal   Passive Abduction: normal     Hip Exam   Tenderness Location: no tenderness    Left Side Rheumatological Exam     The patient is tender to palpation of the 2nd PIP and 3rd PIP.    She has swelling of the 2nd PIP and 3rd PIP      Lymphadenopathy:     She has no cervical adenopathy.   Neurological: " She is alert and oriented to person, place, and time. She displays normal reflexes. She exhibits normal muscle tone. Gait normal.   Skin: Skin is warm and dry. Rash noted. No erythema.     Psoriasis rash noted to her scalp,bilateral forearms and feet.    Psychiatric: Mood, memory and affect normal.   Musculoskeletal: She exhibits tenderness. She exhibits no edema.   Chronic damage noted to clay pips, dips,  Dip enlargement to right 1,4,5 digit, 3 pip   Left 1,2,3,5 dip enlargement 4, 5 pip    Swelling and tenderness to Right 2 pip and left 1, 2,3, pip     Decreased range of motion at neck with extension and flexion                Recent Results (from the past 168 hour(s))   CBC auto differential    Collection Time: 12/11/17 10:16 AM   Result Value Ref Range    WBC 5.46 3.90 - 12.70 K/uL    RBC 4.12 4.00 - 5.40 M/uL    Hemoglobin 12.5 12.0 - 16.0 g/dL    Hematocrit 38.6 37.0 - 48.5 %    MCV 94 82 - 98 fL    MCH 30.3 27.0 - 31.0 pg    MCHC 32.4 32.0 - 36.0 g/dL    RDW 14.2 11.5 - 14.5 %    Platelets 214 150 - 350 K/uL    MPV 11.1 9.2 - 12.9 fL    Gran # 3.7 1.8 - 7.7 K/uL    Lymph # 1.3 1.0 - 4.8 K/uL    Mono # 0.3 0.3 - 1.0 K/uL    Eos # 0.1 0.0 - 0.5 K/uL    Baso # 0.02 0.00 - 0.20 K/uL    Gran% 67.6 38.0 - 73.0 %    Lymph% 24.5 18.0 - 48.0 %    Mono% 5.3 4.0 - 15.0 %    Eosinophil% 2.2 0.0 - 8.0 %    Basophil% 0.4 0.0 - 1.9 %    Differential Method Automated    Comprehensive metabolic panel    Collection Time: 12/11/17 10:16 AM   Result Value Ref Range    Sodium 139 136 - 145 mmol/L    Potassium 4.7 3.5 - 5.1 mmol/L    Chloride 104 95 - 110 mmol/L    CO2 24 23 - 29 mmol/L    Glucose 106 70 - 110 mg/dL    BUN, Bld 11 8 - 23 mg/dL    Creatinine 0.8 0.5 - 1.4 mg/dL    Calcium 9.3 8.7 - 10.5 mg/dL    Total Protein 7.0 6.0 - 8.4 g/dL    Albumin 3.7 3.5 - 5.2 g/dL    Total Bilirubin 0.3 0.1 - 1.0 mg/dL    Alkaline Phosphatase 56 55 - 135 U/L    AST 19 10 - 40 U/L    ALT 11 10 - 44 U/L    Anion Gap 11 8 - 16 mmol/L    eGFR  if African American >60 >60 mL/min/1.73 m^2    eGFR if non African American >60 >60 mL/min/1.73 m^2     Results for ZHERA LING (MRN [id number]) as of 12/11/2017 16:58   Ref. Range 8/10/2016 11:15 10/18/2016 13:40 4/6/2017 14:10 8/16/2017 13:33 12/11/2017 10:16   Sed Rate Latest Ref Range: 0 - 20 mm/Hr 3 6 10 4 5     Results for ZEHRA LING (MRN [id number]) as of 12/11/2017 16:58   Ref. Range 12/8/2015 10:53 8/10/2016 11:15 10/18/2016 13:40 4/6/2017 14:10 8/16/2017 13:33   CRP Latest Ref Range: 0.0 - 8.2 mg/L 5.0 5.7 11.0 (H) 10.9 (H) 9.5 (H)     Results for ZEHRA LING (MRN [id number]) as of 12/11/2017 16:58   Ref. Range 5/27/2015 10:40 8/16/2017 14:33   Vit D, 25-Hydroxy Latest Ref Range: 30 - 96 ng/mL 29 (L) 46       Xray bilateral hands 8/16/17:  Narrative     Technique: PA, lateral, and oblique views were obtained of the bilateral hands    Comparison: 8/10/2016    Findings: No acute fractures or dislocations visualized. Fairly extensive multi-articular arthritic findings throughout the bilateral hands and wrists with associated chronic erosive findings appear unchanged from prior.  Findings predominantly involve the IP joints as well as the bilateral 1st CMC joints. No new erosive changes appreciated.   Impression       Unchanged appearance of the bilateral hands as above.           Assessment:       1. Spondylosis of cervical region without myelopathy or radiculopathy    2. Psoriasis    3. Immunocompromised    4. Psoriatic arthritis    5. Medication monitoring encounter    6. Neck pain        Impression:    Psoriatic arthritis/Psoriasis: severe resorptive type of PsA;  Mod disease activity with swollen and tender joints; cdai 13 however patient feeling much better overall since starting 300 mg Cosentyx and her rashes have improved. She feels that her disease is under much better control than in the past with other medications. Failed in the past mtx (GI upset), humira, enbrel, remicade, simponi, stelara(one  dose ineffective), cimzia ($).  Currently on Cosentyx 300mg monthly.;  Psoriasis: rash resolved on scalp, clay forearms, clay feet; occ. using topical   Cervical Degenerative Disc disease:  history of surgery on her neck, Active symptoms-likely with radiation into right upper arm. Severely decreased range of motion at neck.   Medication monitoring; no issues with toxicity  Vitamin D normal: continue with maintenance at 1000 units daily   Immunocompromised: patient reports she received zoster and high dose influenza   Plan:       - Continue with cosentyx at 300mg SC monthly; will refill    - Physical therapy for cervical spine which is likely causing pain in right arm  - Medrol dose pack to decrease inflammation in the neck and help with pain in right arm.    - Continue Mobic 15 mg and tylenol arthritis up to 3 g daily       Return in 4 months with reg 4 labs with Zhanna Rollins  I have personally reviewed the history with the patient in the room, examined the patient's joints and pertinent organ systems in the room and developed the above Impressions and Plan. The Imp: and Plan were discussed with the patient before leaving the room. KARINE Ortega MD

## 2018-04-04 ENCOUNTER — HOSPITAL ENCOUNTER (OUTPATIENT)
Dept: RADIOLOGY | Facility: HOSPITAL | Age: 68
Discharge: HOME | End: 2018-04-04
Attending: PHYSICIAN ASSISTANT
Payer: MEDICARE

## 2018-04-04 ENCOUNTER — OFFICE VISIT (OUTPATIENT)
Dept: RHEUMATOLOGY | Facility: CLINIC | Age: 68
End: 2018-04-04
Payer: MEDICARE

## 2018-04-04 VITALS
SYSTOLIC BLOOD PRESSURE: 124 MMHG | DIASTOLIC BLOOD PRESSURE: 77 MMHG | HEART RATE: 96 BPM | HEIGHT: 67 IN | WEIGHT: 157.63 LBS | BODY MASS INDEX: 24.74 KG/M2

## 2018-04-04 DIAGNOSIS — L40.9 PSORIASIS: Primary | ICD-10-CM

## 2018-04-04 DIAGNOSIS — D84.9 IMMUNOCOMPROMISED: ICD-10-CM

## 2018-04-04 DIAGNOSIS — E55.9 HYPOVITAMINOSIS D: Chronic | ICD-10-CM

## 2018-04-04 DIAGNOSIS — M25.511 RIGHT SHOULDER PAIN, UNSPECIFIED CHRONICITY: ICD-10-CM

## 2018-04-04 DIAGNOSIS — L40.50 PSORIATIC ARTHRITIS: ICD-10-CM

## 2018-04-04 DIAGNOSIS — Z79.899 HIGH RISK MEDICATION USE: Chronic | ICD-10-CM

## 2018-04-04 DIAGNOSIS — M47.22 OSTEOARTHRITIS OF SPINE WITH RADICULOPATHY, CERVICAL REGION: ICD-10-CM

## 2018-04-04 PROCEDURE — 99214 OFFICE O/P EST MOD 30 MIN: CPT | Mod: 25,S$GLB,, | Performed by: PHYSICIAN ASSISTANT

## 2018-04-04 PROCEDURE — 73030 X-RAY EXAM OF SHOULDER: CPT | Mod: TC,FY,PO,RT

## 2018-04-04 PROCEDURE — 73030 X-RAY EXAM OF SHOULDER: CPT | Mod: 26,RT,, | Performed by: RADIOLOGY

## 2018-04-04 PROCEDURE — 20610 DRAIN/INJ JOINT/BURSA W/O US: CPT | Mod: RT,S$GLB,, | Performed by: PHYSICIAN ASSISTANT

## 2018-04-04 PROCEDURE — 99999 PR PBB SHADOW E&M-EST. PATIENT-LVL IV: CPT | Mod: PBBFAC,,, | Performed by: PHYSICIAN ASSISTANT

## 2018-04-04 RX ORDER — BETAMETHASONE SODIUM PHOSPHATE AND BETAMETHASONE ACETATE 3; 3 MG/ML; MG/ML
3 INJECTION, SUSPENSION INTRA-ARTICULAR; INTRALESIONAL; INTRAMUSCULAR; SOFT TISSUE
Status: COMPLETED | OUTPATIENT
Start: 2018-04-04 | End: 2018-04-04

## 2018-04-04 RX ORDER — FOLIC ACID 1 MG/1
1 TABLET ORAL DAILY
Qty: 90 TABLET | Refills: 3 | Status: SHIPPED | OUTPATIENT
Start: 2018-04-04 | End: 2023-03-01

## 2018-04-04 RX ORDER — METHYLPREDNISOLONE ACETATE 80 MG/ML
80 INJECTION, SUSPENSION INTRA-ARTICULAR; INTRALESIONAL; INTRAMUSCULAR; SOFT TISSUE
Status: COMPLETED | OUTPATIENT
Start: 2018-04-04 | End: 2018-04-04

## 2018-04-04 RX ADMIN — BETAMETHASONE SODIUM PHOSPHATE AND BETAMETHASONE ACETATE 3 MG: 3; 3 INJECTION, SUSPENSION INTRA-ARTICULAR; INTRALESIONAL; INTRAMUSCULAR; SOFT TISSUE at 01:04

## 2018-04-04 RX ADMIN — METHYLPREDNISOLONE ACETATE 80 MG: 80 INJECTION, SUSPENSION INTRA-ARTICULAR; INTRALESIONAL; INTRAMUSCULAR; SOFT TISSUE at 01:04

## 2018-04-04 ASSESSMENT — ROUTINE ASSESSMENT OF PATIENT INDEX DATA (RAPID3): MDHAQ FUNCTION SCORE: .9

## 2018-04-04 NOTE — PATIENT INSTRUCTIONS
cosentyx same   Methotrexate injection weekly rasuvo 15mgwk daily folic acid   Xray shoulder will send to ortho if no improvement   Vit D daily 1000units daily   Tumeric 1500mg is great

## 2018-04-04 NOTE — PROGRESS NOTES
Subjective:       Patient ID: Karin Smith is a 67 y.o. female.    Chief Complaint: Psoriatic Arthritis    Karin is seen here for Chronic Psoriatic arthritis with skin psoriasis, severe resorptive type.  She has failed  humira, enbrel, remicade, simponi, mtx (GI upset), stelara, and cimzia ($$). She was  moved to cosentyx 150mg in October 2016 and we increased last year due to continued joint symptoms and rash.  The higher dose has done well, no rash, improved joints but still with some joint swelling and tenderness. Xrays of hands done last fall showed stable erosions. She has sig cervical degenerative arthritis with radiation into her right arm. Also c/o shoulder pain.  We sent to PT but this hasn't done much for her pain.  Her shoulder pain is worse with pain with overhead activity and reaching behind her. She has difficulty putting shirts on over her head, tenderness to shoulder.  Today her pain is 4/10 in her fingers, shoulder and arm. Has taken mobic, not much help. Medrol dose pack brief relief.     History of vit D deficiency, was on vit D 50,000Units weekly but started otc daily 1Kunits.           Fibromyalgia   Associated symptoms include arthralgias, joint swelling, neck pain and a rash. Pertinent negatives include no chest pain, coughing, fatigue, fever, headaches, myalgias, nausea, vomiting or weakness.     Review of Systems   Constitutional: Negative for activity change, fatigue, fever and unexpected weight change.   HENT: Negative for mouth sores, tinnitus and trouble swallowing.    Eyes: Negative for itching and visual disturbance.   Respiratory: Negative for cough and shortness of breath.    Cardiovascular: Negative for chest pain, palpitations and leg swelling.   Gastrointestinal: Negative for blood in stool, diarrhea, nausea and vomiting.   Genitourinary: Negative for dysuria, flank pain, frequency and hematuria.   Musculoskeletal: Positive for arthralgias, back pain, joint swelling and neck  "pain. Negative for myalgias and neck stiffness.   Skin: Positive for rash. Negative for wound.   Neurological: Negative for dizziness, weakness, light-headedness and headaches.   Hematological: Negative for adenopathy. Does not bruise/bleed easily.        Increased urinary frequency   Psychiatric/Behavioral: Negative for confusion and sleep disturbance.         Objective:     /77   Pulse 96   Ht 5' 7" (1.702 m)   Wt 71.5 kg (157 lb 10.1 oz)   BMI 24.69 kg/m²      Physical Exam   Constitutional: She is oriented to person, place, and time and well-developed, well-nourished, and in no distress. No distress.   HENT:   Head: Normocephalic.   Eyes: EOM are normal. Pupils are equal, round, and reactive to light.   Neck: Normal range of motion. Neck supple. No thyromegaly present.   Cardiovascular: Normal rate, regular rhythm and normal heart sounds.  Exam reveals no gallop and no friction rub.    No murmur heard.  Pulmonary/Chest: Breath sounds normal. She has no wheezes. She has no rales. She exhibits no tenderness.   Abdominal: Soft. There is no tenderness. There is no rebound.       Right Side Rheumatological Exam     The patient is tender to palpation of the shoulder, elbow, wrist, 1st PIP, 3rd PIP and 4th PIP    She has swelling of the 2nd PIP    The patient has an enlarged 1st PIP and 3rd PIP    Hip Exam   Tenderness Location: no tenderness    Left Side Rheumatological Exam     The patient is tender to palpation of the 2nd PIP.    She has swelling of the 2nd PIP    The patient has an enlarged 1st PIP and 3rd PIP.      Lymphadenopathy:     She has no cervical adenopathy.   Neurological: She is alert and oriented to person, place, and time. She displays normal reflexes. She exhibits normal muscle tone. Gait normal.   Skin: Skin is warm and dry. No rash noted. No erythema.     Psychiatric: Mood, memory and affect normal.   Musculoskeletal: Normal range of motion. She exhibits tenderness. She exhibits no edema. "   Chronic damage noted to clay pips, dips,  Dip enlargement to right 1,4,5 digit, 3 pip   Left 1,2,3,5 dip enlargement 4, 5 pip    +Enthesitis to her right elbow lateral epicondyle    Right shoulder pain with full ff +impingment sign +tenderness             Recent Results (from the past 168 hour(s))   CBC auto differential    Collection Time: 04/04/18 11:23 AM   Result Value Ref Range    WBC 5.84 3.90 - 12.70 K/uL    RBC 4.30 4.00 - 5.40 M/uL    Hemoglobin 13.0 12.0 - 16.0 g/dL    Hematocrit 39.3 37.0 - 48.5 %    MCV 91 82 - 98 fL    MCH 30.2 27.0 - 31.0 pg    MCHC 33.1 32.0 - 36.0 g/dL    RDW 14.5 11.5 - 14.5 %    Platelets 239 150 - 350 K/uL    MPV 10.8 9.2 - 12.9 fL    Gran # (ANC) 3.9 1.8 - 7.7 K/uL    Lymph # 1.3 1.0 - 4.8 K/uL    Mono # 0.5 0.3 - 1.0 K/uL    Eos # 0.1 0.0 - 0.5 K/uL    Baso # 0.01 0.00 - 0.20 K/uL    Gran% 66.5 38.0 - 73.0 %    Lymph% 22.1 18.0 - 48.0 %    Mono% 9.1 4.0 - 15.0 %    Eosinophil% 2.1 0.0 - 8.0 %    Basophil% 0.2 0.0 - 1.9 %    Differential Method Automated    Comprehensive metabolic panel    Collection Time: 04/04/18 11:23 AM   Result Value Ref Range    Sodium 139 136 - 145 mmol/L    Potassium 4.0 3.5 - 5.1 mmol/L    Chloride 103 95 - 110 mmol/L    CO2 25 23 - 29 mmol/L    Glucose 92 70 - 110 mg/dL    BUN, Bld 11 8 - 23 mg/dL    Creatinine 0.8 0.5 - 1.4 mg/dL    Calcium 9.5 8.7 - 10.5 mg/dL    Total Protein 7.1 6.0 - 8.4 g/dL    Albumin 3.6 3.5 - 5.2 g/dL    Total Bilirubin 0.2 0.1 - 1.0 mg/dL    Alkaline Phosphatase 74 55 - 135 U/L    AST 23 10 - 40 U/L    ALT 11 10 - 44 U/L    Anion Gap 11 8 - 16 mmol/L    eGFR if African American >60 >60 mL/min/1.73 m^2    eGFR if non African American >60 >60 mL/min/1.73 m^2   Sedimentation rate, manual    Collection Time: 04/04/18 11:23 AM   Result Value Ref Range    Sed Rate 4 0 - 20 mm/Hr                  Assessment:       1. Psoriasis    2. Psoriatic arthritis    3. Osteoarthritis of spine with radiculopathy, cervical region    4. High risk  medication use    5. Immunocompromised    6. Hypovitaminosis D    7. Right shoulder pain, unspecified chronicity        Impression:    Psoriatic arthritis/Psoriasis: severe resorptive type of PsA; mod joint activity with right elbow enthesitis cdai 16, mhaq 0.9 failed in the past oral mtx (GI upset), humira, enbrel, remicade, simponi, stelara(one dose ineffective), cimzia ($).  Currently on Cosentyx 300mg monthly; hand xrays stable; taking tumeric daily     Psoriasis: cosentyx 300mg helped rash, has topicals     Cervical Degenerative Disc disease:  history of surgery on her neck, pain with radiation into right arm; sent to PT didn't help too much    Medication monitoring; no issues with toxicity    Low vit D: h/o weekly vit D 50,000Units, now normal level, on otc vit D    Immunocompromised: utd except zoster    Right shoulder pain: failed nsaid, some short improvement with medrol dose pack, PT hasn't helped, some pain could be from neck but seems like there is some rotator cuff tendonitis/bursitis component  Plan:       Inject right shoulder as below  Xray right shoulder   If shoulder issues persist send to ortho, may need mri-failed nsaids, joint inj, PT  Cont cosentyx 300mg /mos  Start mtx 15mg/week (rasuvo) as oral caused gi upset, sig hand/joint deformities prevent patient from ability to use vial and syringe to draw up medication, pen injector needed  Daily folic acid  Cont daily vit d otc  Cont tumeric daily       Return in 4 months with reg 4 labs,     Procedure note: After verbal consent was obtained.  The right shoulder was prepared with sterile prep.  The skin was anesthetized with 1% ethyl chloride.  The shoulder joint was injected with 2.5 cc of 1% lidocaine to anesthetize the joint.  The shoulder joint was then injected with 3mg celestone/soluspan, 80mg  Depo-Medrol and 1.0 mL of 1% lidocaine.  Hemostasis was obtained.  The patient tolerated procedure well with no complications.  Patient discharged with  icing instructions

## 2018-04-11 ENCOUNTER — PATIENT MESSAGE (OUTPATIENT)
Dept: RHEUMATOLOGY | Facility: CLINIC | Age: 68
End: 2018-04-11

## 2018-04-25 ENCOUNTER — PATIENT MESSAGE (OUTPATIENT)
Dept: RHEUMATOLOGY | Facility: CLINIC | Age: 68
End: 2018-04-25

## 2018-04-26 ENCOUNTER — TELEPHONE (OUTPATIENT)
Dept: RHEUMATOLOGY | Facility: CLINIC | Age: 68
End: 2018-04-26

## 2018-04-26 ENCOUNTER — PATIENT MESSAGE (OUTPATIENT)
Dept: RHEUMATOLOGY | Facility: CLINIC | Age: 68
End: 2018-04-26

## 2018-04-26 RX ORDER — SYRINGE WITH NEEDLE, 1 ML 27GX1/2"
SYRINGE, EMPTY DISPOSABLE MISCELLANEOUS
Qty: 30 SYRINGE | Refills: 2 | Status: SHIPPED | OUTPATIENT
Start: 2018-04-26 | End: 2023-03-01

## 2018-04-26 NOTE — TELEPHONE ENCOUNTER
----- Message from Richelle Benjamin sent at 4/26/2018 11:21 AM CDT -----  Contact: Pt  .Patient calling in regards to a missed call. Please contact pt at [phone]

## 2018-04-26 NOTE — TELEPHONE ENCOUNTER
Pt returned call . Called to set up a nurse visit for methotrexate injection. Pt stated she thought about and she knows how to give the injection and she will try it and if she has any questions she will call us.

## 2018-06-27 ENCOUNTER — PATIENT MESSAGE (OUTPATIENT)
Dept: RHEUMATOLOGY | Facility: CLINIC | Age: 68
End: 2018-06-27

## 2018-08-07 RX ORDER — FLUTICASONE PROPIONATE 50 MCG
1 SPRAY, SUSPENSION (ML) NASAL
COMMUNITY
Start: 2018-04-04 | End: 2019-04-04

## 2018-08-24 ENCOUNTER — TELEPHONE (OUTPATIENT)
Dept: RHEUMATOLOGY | Facility: CLINIC | Age: 68
End: 2018-08-24

## 2018-10-01 ENCOUNTER — TELEPHONE (OUTPATIENT)
Dept: RHEUMATOLOGY | Facility: CLINIC | Age: 68
End: 2018-10-01

## 2018-10-01 NOTE — TELEPHONE ENCOUNTER
----- Message from Radha Devries sent at 10/1/2018  4:13 PM CDT -----  Contact: Pt.   Pt is calling regarding requesting nurse to call. .Pt states that she needs to speak with nurse regarding aching pain ..[phone] (home) [phone] (work)

## 2018-10-10 ENCOUNTER — LAB VISIT (OUTPATIENT)
Dept: LAB | Facility: HOSPITAL | Age: 68
End: 2018-10-10
Attending: PHYSICIAN ASSISTANT
Payer: MEDICARE

## 2018-10-10 ENCOUNTER — OFFICE VISIT (OUTPATIENT)
Dept: RHEUMATOLOGY | Facility: CLINIC | Age: 68
End: 2018-10-10
Payer: MEDICARE

## 2018-10-10 VITALS
HEIGHT: 67 IN | DIASTOLIC BLOOD PRESSURE: 82 MMHG | WEIGHT: 154.75 LBS | HEART RATE: 85 BPM | BODY MASS INDEX: 24.29 KG/M2 | SYSTOLIC BLOOD PRESSURE: 111 MMHG

## 2018-10-10 DIAGNOSIS — L40.9 PSORIASIS: ICD-10-CM

## 2018-10-10 DIAGNOSIS — Z79.899 HIGH RISK MEDICATION USE: Chronic | ICD-10-CM

## 2018-10-10 DIAGNOSIS — L40.50 PSORIATIC ARTHRITIS: Primary | ICD-10-CM

## 2018-10-10 DIAGNOSIS — M50.90 CERVICAL NECK PAIN WITH EVIDENCE OF DISC DISEASE: ICD-10-CM

## 2018-10-10 DIAGNOSIS — L40.50 PSORIATIC ARTHRITIS: ICD-10-CM

## 2018-10-10 LAB
ALBUMIN SERPL BCP-MCNC: 3.9 G/DL
ALP SERPL-CCNC: 72 U/L
ALT SERPL W/O P-5'-P-CCNC: 18 U/L
ANION GAP SERPL CALC-SCNC: 10 MMOL/L
AST SERPL-CCNC: 22 U/L
BASOPHILS # BLD AUTO: 0.02 K/UL
BASOPHILS NFR BLD: 0.3 %
BILIRUB SERPL-MCNC: 0.2 MG/DL
BUN SERPL-MCNC: 17 MG/DL
CALCIUM SERPL-MCNC: 10.1 MG/DL
CHLORIDE SERPL-SCNC: 103 MMOL/L
CO2 SERPL-SCNC: 25 MMOL/L
CREAT SERPL-MCNC: 0.9 MG/DL
CRP SERPL-MCNC: 12.8 MG/L
DIFFERENTIAL METHOD: NORMAL
EOSINOPHIL # BLD AUTO: 0.2 K/UL
EOSINOPHIL NFR BLD: 3 %
ERYTHROCYTE [DISTWIDTH] IN BLOOD BY AUTOMATED COUNT: 14.4 %
ERYTHROCYTE [SEDIMENTATION RATE] IN BLOOD BY WESTERGREN METHOD: 6 MM/HR
EST. GFR  (AFRICAN AMERICAN): >60 ML/MIN/1.73 M^2
EST. GFR  (NON AFRICAN AMERICAN): >60 ML/MIN/1.73 M^2
GLUCOSE SERPL-MCNC: 95 MG/DL
HCT VFR BLD AUTO: 42.6 %
HGB BLD-MCNC: 13.9 G/DL
LYMPHOCYTES # BLD AUTO: 2.5 K/UL
LYMPHOCYTES NFR BLD: 33.8 %
MCH RBC QN AUTO: 30.4 PG
MCHC RBC AUTO-ENTMCNC: 32.6 G/DL
MCV RBC AUTO: 93 FL
MONOCYTES # BLD AUTO: 0.5 K/UL
MONOCYTES NFR BLD: 7.1 %
NEUTROPHILS # BLD AUTO: 4.1 K/UL
NEUTROPHILS NFR BLD: 55.8 %
PLATELET # BLD AUTO: 261 K/UL
PMV BLD AUTO: 11.2 FL
POTASSIUM SERPL-SCNC: 4.2 MMOL/L
PROT SERPL-MCNC: 7.4 G/DL
RBC # BLD AUTO: 4.57 M/UL
SODIUM SERPL-SCNC: 138 MMOL/L
WBC # BLD AUTO: 7.3 K/UL

## 2018-10-10 PROCEDURE — 80053 COMPREHEN METABOLIC PANEL: CPT | Mod: PO

## 2018-10-10 PROCEDURE — 36415 COLL VENOUS BLD VENIPUNCTURE: CPT | Mod: PO

## 2018-10-10 PROCEDURE — 99999 PR PBB SHADOW E&M-EST. PATIENT-LVL IV: CPT | Mod: PBBFAC,,, | Performed by: INTERNAL MEDICINE

## 2018-10-10 PROCEDURE — 99214 OFFICE O/P EST MOD 30 MIN: CPT | Mod: PBBFAC,PO | Performed by: INTERNAL MEDICINE

## 2018-10-10 PROCEDURE — 86140 C-REACTIVE PROTEIN: CPT

## 2018-10-10 PROCEDURE — 85025 COMPLETE CBC W/AUTO DIFF WBC: CPT | Mod: PO

## 2018-10-10 PROCEDURE — 99214 OFFICE O/P EST MOD 30 MIN: CPT | Mod: S$PBB,,, | Performed by: INTERNAL MEDICINE

## 2018-10-10 PROCEDURE — 85651 RBC SED RATE NONAUTOMATED: CPT | Mod: PO

## 2018-10-10 RX ORDER — TRAMADOL HYDROCHLORIDE 50 MG/1
50 TABLET ORAL EVERY 12 HOURS PRN
Qty: 60 TABLET | Refills: 2 | Status: SHIPPED | OUTPATIENT
Start: 2018-10-10 | End: 2024-03-11

## 2018-10-10 RX ORDER — ADALIMUMAB 40MG/0.8ML
40 KIT SUBCUTANEOUS
Qty: 2 PEN | Refills: 5 | Status: SHIPPED | OUTPATIENT
Start: 2018-10-10 | End: 2019-01-29 | Stop reason: ALTCHOICE

## 2018-10-10 NOTE — PROGRESS NOTES
CC:  Chief Complaint   Patient presents with    Psoriatic Arthritis       History of Present Illness:  Karin Zhu a 68 y.o.yo female   Patient Active Problem List   Diagnosis    Psoriatic arthritis    Hypothyroid    Hypovitaminosis D    Fibromyalgia    Back pain    Psoriasis    Degenerative arthritis of cervical spine    Reflux    Neck pain    High risk medication use    Immunocompromised     Here for follow-up of psoriasis and psoriatic arthritis  Seen by Zhanna in our clinic  First time seeing me    She is on cosentyx  300 mg q.4 weeks now  Today presents with severe pain in both hands with swelling of bilateral 2nd fingers  She also mentions of neck pain, she has chronic neck pain had a fusion surgery in the past many years ago in [place]   Received ILENE in the past  Neck continues to bother her with radiation to the shoulder and right arm, with intermittent tingling  She received a right shoulder injection last visit and this helped for few weeks  X-ray was suggestive of AC and G-H joint arthritis  She takes Aleve p.r.n.  Has taken tramadol in the past which has helped  Denies any feet pain or swelling  Pain in the hands worse both in a.m. and also aggravated with any activity and touch  Stop methotrexate as she did not like the side effects mentioned in literature  Was intolerant of gabapentin in the past as well    She feels cosentyx  is not working, feels Humira in the past work better for her  She would like to give it a try again    Past Medical History:   Diagnosis Date    Acid reflux     Allergy     Arthritis     Degenerative disc disease     Thyroid disease        Past Surgical History:   Procedure Laterality Date     SECTION      HYSTERECTOMY      TONSILLECTOMY           Social History     Tobacco Use    Smoking status: Former Smoker    Smokeless tobacco: Never Used   Substance Use Topics    Alcohol use: Yes    Drug use: No       Family History   Problem Relation Age  of Onset    Cancer Mother     Cancer Father     Heart disease Father     Kidney disease Father     Cancer Brother     Cancer Brother     Diabetes Mellitus Brother        Review of patient's allergies indicates:   Allergen Reactions    Benadryl [diphenhydramine hcl]              Review of Systems:  Constitutional: Denies fever, chills. No recent weight changes.   Fatigue: yes   Muscle weakness: no  Headaches: no new headaches  Eyes: No redness or dryness.  No recent visual changes.  ENT: Denies dry mouth. No oral or nasal ulcers.  Card: No chest pain.  Resp: No cough or sob.   Gastro: No nausea or vomiting.  No heartburn.  Constipation: no  Diarrhea: no  Genito:  No dysuria.  No genital ulcers.  Skin: No rash.  Raynauds:no  Neuro: No numbness / tingling.   Psych: No depression, anxiety  Endo:  no excess thirst.  Heme: no abnormal bleeding or bruising  Clots:none   Miscarriages :     OBJECTIVE:     Vital Signs   Vitals:    10/10/18 1019   BP: 111/82   Pulse: 85     Physical Exam:  General Appearance:  NAD.   Gait: not favoring.  HEENT: PERRL.  Eyes not dry or injected.  No nasal ulcers.  Mouth not dry, no oral lesions.  Lymph: cervical, supraclavicular or axillary nodes: none abnormal   Cardio: no irregularity of S1 or S2.  No gallops or rubs.   Resp: Normal respiratory motion. Clear to auscultation bilaterally.   No abnormal chest conformation.  Abd: Soft, non-tender, nondistended.  No masses.   Skin: Head and neck,  and extremities examined. No rashes.   Neuro: Ox3.   Cranial nerves II-XII grossly intact.   Sensation intact  in both distal LE and upper extremities to light touch.  Musculoskeletal Exam:  + synovitis both hands  B/l 2nd finger dactylitis  Enlarged prominent PIP/DIP    Rt shoulder:  Tenderness over AC joint, subacromial tenderness present  Pain on range of motion    Spine :  Cervical and lumbar facet tenderness    Tender points:  Muscle strength:Equal and full in all mm groups of the upper and  lower ext.    Laboratory:   Results for orders placed or performed in visit on 04/04/18   CBC auto differential   Result Value Ref Range    WBC 5.84 3.90 - 12.70 K/uL    RBC 4.30 4.00 - 5.40 M/uL    Hemoglobin 13.0 12.0 - 16.0 g/dL    Hematocrit 39.3 37.0 - 48.5 %    MCV 91 82 - 98 fL    MCH 30.2 27.0 - 31.0 pg    MCHC 33.1 32.0 - 36.0 g/dL    RDW 14.5 11.5 - 14.5 %    Platelets 239 150 - 350 K/uL    MPV 10.8 9.2 - 12.9 fL    Gran # (ANC) 3.9 1.8 - 7.7 K/uL    Lymph # 1.3 1.0 - 4.8 K/uL    Mono # 0.5 0.3 - 1.0 K/uL    Eos # 0.1 0.0 - 0.5 K/uL    Baso # 0.01 0.00 - 0.20 K/uL    Gran% 66.5 38.0 - 73.0 %    Lymph% 22.1 18.0 - 48.0 %    Mono% 9.1 4.0 - 15.0 %    Eosinophil% 2.1 0.0 - 8.0 %    Basophil% 0.2 0.0 - 1.9 %    Differential Method Automated    Comprehensive metabolic panel   Result Value Ref Range    Sodium 139 136 - 145 mmol/L    Potassium 4.0 3.5 - 5.1 mmol/L    Chloride 103 95 - 110 mmol/L    CO2 25 23 - 29 mmol/L    Glucose 92 70 - 110 mg/dL    BUN, Bld 11 8 - 23 mg/dL    Creatinine 0.8 0.5 - 1.4 mg/dL    Calcium 9.5 8.7 - 10.5 mg/dL    Total Protein 7.1 6.0 - 8.4 g/dL    Albumin 3.6 3.5 - 5.2 g/dL    Total Bilirubin 0.2 0.1 - 1.0 mg/dL    Alkaline Phosphatase 74 55 - 135 U/L    AST 23 10 - 40 U/L    ALT 11 10 - 44 U/L    Anion Gap 11 8 - 16 mmol/L    eGFR if African American >60 >60 mL/min/1.73 m^2    eGFR if non African American >60 >60 mL/min/1.73 m^2   C-reactive protein   Result Value Ref Range    CRP 19.5 (H) 0.0 - 8.2 mg/L   Sedimentation rate, manual   Result Value Ref Range    Sed Rate 4 0 - 20 mm/Hr     Imaging :    Notes reviewed  Other procedures:    ASSESSMENT/PLAN:     Psoriatic arthritis  -     Ambulatory Referral to Pain Clinic    Cervical neck pain with evidence of disc disease  -     Ambulatory Referral to Pain Clinic    Other orders  -     adalimumab (HUMIRA PEN) 40 mg/0.8 mL PnKt; Inject 0.8 mLs (40 mg total) into the skin every 14 (fourteen) days.  Dispense: 2 pen; Refill: 5  -      traMADol (ULTRAM) 50 mg tablet; Take 1 tablet (50 mg total) by mouth every 12 (twelve) hours as needed for Pain.  Dispense: 60 tablet; Refill: 2      1:  Psoriatic arthritis:  Uncontrolled with dactylitis involving bilateral 2nd fingers  Persistent stiffness in hands, with swelling and tenderness    Stop cosentyx   Try Humira again for 3 months  Add Mobic 15 mg daily  No Aleve  on Mobic    If she fails Humira in next visit will consider taltz     2:  Chronic neck pain:  Refer Pain Management  Tramadol 1 tablet p.o. b.i.d. p.r.n.  Advised she has  to follow up with spine surgery again    3 month return, with labs  Pending labs including inflammatory markers for review today    Risks vs Benefits and potential side effects of medication prescribed today were discussed with patient. Medication literature given to patient up discharge  Went over uptodate information /literature on the meds prescribed today      Disclaimer: This note was prepared using voice recognition system and is likely to have sound alike errors and is not proof read.  Please call me with any questions.

## 2018-10-11 ENCOUNTER — PATIENT MESSAGE (OUTPATIENT)
Dept: RHEUMATOLOGY | Facility: CLINIC | Age: 68
End: 2018-10-11

## 2018-10-22 ENCOUNTER — TELEPHONE (OUTPATIENT)
Dept: RHEUMATOLOGY | Facility: CLINIC | Age: 68
End: 2018-10-22

## 2019-01-03 ENCOUNTER — PATIENT MESSAGE (OUTPATIENT)
Dept: RHEUMATOLOGY | Facility: CLINIC | Age: 69
End: 2019-01-03

## 2019-01-16 ENCOUNTER — HOSPITAL ENCOUNTER (OUTPATIENT)
Dept: RADIOLOGY | Facility: HOSPITAL | Age: 69
Discharge: HOME | End: 2019-01-16
Attending: INTERNAL MEDICINE
Payer: MEDICARE

## 2019-01-16 ENCOUNTER — OFFICE VISIT (OUTPATIENT)
Dept: URGENT CARE | Facility: CLINIC | Age: 69
End: 2019-01-16
Payer: MEDICARE

## 2019-01-16 ENCOUNTER — OFFICE VISIT (OUTPATIENT)
Dept: RHEUMATOLOGY | Facility: CLINIC | Age: 69
End: 2019-01-16
Payer: MEDICARE

## 2019-01-16 ENCOUNTER — PATIENT MESSAGE (OUTPATIENT)
Dept: RHEUMATOLOGY | Facility: CLINIC | Age: 69
End: 2019-01-16

## 2019-01-16 VITALS
WEIGHT: 149.06 LBS | BODY MASS INDEX: 23.39 KG/M2 | HEART RATE: 93 BPM | SYSTOLIC BLOOD PRESSURE: 139 MMHG | DIASTOLIC BLOOD PRESSURE: 92 MMHG | HEIGHT: 67 IN

## 2019-01-16 VITALS
DIASTOLIC BLOOD PRESSURE: 76 MMHG | HEART RATE: 101 BPM | WEIGHT: 149.06 LBS | OXYGEN SATURATION: 98 % | SYSTOLIC BLOOD PRESSURE: 114 MMHG | TEMPERATURE: 98 F | BODY MASS INDEX: 23.34 KG/M2 | RESPIRATION RATE: 18 BRPM

## 2019-01-16 DIAGNOSIS — L40.50 PSORIATIC ARTHRITIS: ICD-10-CM

## 2019-01-16 DIAGNOSIS — M17.2 POST-TRAUMATIC OSTEOARTHRITIS OF BOTH KNEES: Primary | ICD-10-CM

## 2019-01-16 DIAGNOSIS — M17.2 POST-TRAUMATIC OSTEOARTHRITIS OF BOTH KNEES: ICD-10-CM

## 2019-01-16 DIAGNOSIS — S01.511A LIP LACERATION, INITIAL ENCOUNTER: Primary | ICD-10-CM

## 2019-01-16 PROCEDURE — 99999 PR PBB SHADOW E&M-EST. PATIENT-LVL III: ICD-10-PCS | Mod: PBBFAC,,, | Performed by: FAMILY MEDICINE

## 2019-01-16 PROCEDURE — 1101F PR PT FALLS ASSESS DOC 0-1 FALLS W/OUT INJ PAST YR: ICD-10-PCS | Mod: CPTII,S$GLB,, | Performed by: INTERNAL MEDICINE

## 2019-01-16 PROCEDURE — 73562 X-RAY EXAM OF KNEE 3: CPT | Mod: 26,50,, | Performed by: RADIOLOGY

## 2019-01-16 PROCEDURE — 73562 X-RAY EXAM OF KNEE 3: CPT | Mod: TC,50

## 2019-01-16 PROCEDURE — 99213 PR OFFICE/OUTPT VISIT, EST, LEVL III, 20-29 MIN: ICD-10-PCS | Mod: S$GLB,,, | Performed by: FAMILY MEDICINE

## 2019-01-16 PROCEDURE — 1101F PR PT FALLS ASSESS DOC 0-1 FALLS W/OUT INJ PAST YR: ICD-10-PCS | Mod: CPTII,S$GLB,, | Performed by: FAMILY MEDICINE

## 2019-01-16 PROCEDURE — 73562 XR KNEE ORTHO BILAT: ICD-10-PCS | Mod: 26,50,, | Performed by: RADIOLOGY

## 2019-01-16 PROCEDURE — 99214 PR OFFICE/OUTPT VISIT, EST, LEVL IV, 30-39 MIN: ICD-10-PCS | Mod: S$GLB,,, | Performed by: INTERNAL MEDICINE

## 2019-01-16 PROCEDURE — 1101F PT FALLS ASSESS-DOCD LE1/YR: CPT | Mod: CPTII,S$GLB,, | Performed by: INTERNAL MEDICINE

## 2019-01-16 PROCEDURE — 99999 PR PBB SHADOW E&M-EST. PATIENT-LVL III: CPT | Mod: PBBFAC,,, | Performed by: INTERNAL MEDICINE

## 2019-01-16 PROCEDURE — 99999 PR PBB SHADOW E&M-EST. PATIENT-LVL III: CPT | Mod: PBBFAC,,, | Performed by: FAMILY MEDICINE

## 2019-01-16 PROCEDURE — 99214 OFFICE O/P EST MOD 30 MIN: CPT | Mod: S$GLB,,, | Performed by: INTERNAL MEDICINE

## 2019-01-16 PROCEDURE — 99213 OFFICE O/P EST LOW 20 MIN: CPT | Mod: S$GLB,,, | Performed by: FAMILY MEDICINE

## 2019-01-16 PROCEDURE — 1101F PT FALLS ASSESS-DOCD LE1/YR: CPT | Mod: CPTII,S$GLB,, | Performed by: FAMILY MEDICINE

## 2019-01-16 PROCEDURE — 99999 PR PBB SHADOW E&M-EST. PATIENT-LVL III: ICD-10-PCS | Mod: PBBFAC,,, | Performed by: INTERNAL MEDICINE

## 2019-01-16 RX ORDER — ESCITALOPRAM OXALATE 10 MG/1
TABLET ORAL
COMMUNITY
Start: 2018-11-26

## 2019-01-16 RX ORDER — AMLODIPINE BESYLATE 5 MG/1
5 TABLET ORAL
COMMUNITY
Start: 2018-10-04 | End: 2019-04-02

## 2019-01-16 RX ORDER — VIT C/E/ZN/COPPR/LUTEIN/ZEAXAN 250MG-90MG
1000 CAPSULE ORAL
COMMUNITY
End: 2023-03-01

## 2019-01-16 NOTE — PATIENT INSTRUCTIONS
continue neosporin ointment on wound  Follow up with PCP re frequent falls        Lip or Mouth Laceration  A laceration is a cut through the skin. When the cut is on the outside of the lip, it may be closed with stitches, surgical tape, or skin glue. Cuts inside the mouth may be sutured or left open, depending on the size. When stitches are used in the mouth, they are usually the kind that dissolve.  A tetanus shot may be given if you are not current on this vaccination and the object that caused the cut may lead to tetanus.    Home care  · If you were given an antibiotic to prevent infection, do not stop taking this medication until you have finished the prescribed course or the healthcare provider tells you to stop.  · The healthcare provider may prescribe medications for pain. Follow instructions for taking these medications.   · Follow the healthcare providers instructions on how to care for the cut.  · Wash your hands with soap and warm water before and after caring for your cut. This helps prevent infection.  · If the cut is inside your mouth, clean the wound by rinsing your mouth after each meal and at bedtime with a mixture of equal parts water and hydrogen peroxide. (Do not swallow!) Or, you can use a cotton swab to apply hydrogen peroxide directly onto the cut.  · Mouth wounds can cause pain when chewing. Soft foods can help with this. If needed, use a local, over-the-counter numbing solution for pain relief, such as one for teething babies. Apply this directly to the wound with a cotton-tip swab or your finger.  · If the wound is bandaged, leave the original bandage in place for 24 hours. Replace it if it becomes wet or dirty. After 24 hours, change it once a day or as directed.  · Clean the wound daily. First, remove the bandage. Then wash the area gently with soap and warm water, or as directed by your childs provider. Use a wet cotton swab to loosen and remove any blood or crust that forms. After  cleaning, apply a thin layer of antibiotic ointment if advised. Then put on a new bandage.  · If the cut is on the outside of the lip and sutures were used, you may shower as usual after the first 24 hours, but do not put your head under water or swim until the sutures are removed. After removing the bandage, wash the area with soap and water. Use a wet cotton swab to loosen and remove any blood or crust that forms. After cleaning, keep the wound clean and dry. Talk with your doctor before applying any antibiotic ointment to the wound. You may apply an adhesive bandage or leave the wound open. Unless told otherwise, sutures on the inside of the mouth will likely dissolve on their own.  · If skin glue was used, do not scratch, rub, or pick at the adhesive film. Do not place tape directly over the film. Do not apply liquid, ointment, or creams to the wound while the film is in place. Do not clean the wound with peroxide and do not apply ointment. Avoid activities that cause heavy sweating until the film has fallen off. Protect the wound from prolonged exposure to sunlight or tanning lamps. You may shower as usual but do not soak the wound in water (no swimming).  Follow-up care  Follow up with your healthcare provider as directed. If you have sutures that won't dissolve on their own, return as directed to have them removed.  When to seek medical advice  Call your healthcare provider right away if any of these occur:  · Wound bleeding not controlled by direct pressure  · Signs of infection, including increasing pain in the wound, increasing wound redness or swelling, or pus or bad odor coming from the wound  · Fever of 100.4°F (38.ºC) or higher or as directed by your healthcare provider  · Stitches come apart or fall out or surgical tape falls off before 5 days  · Wound edges re-open  · Wound changes colors  · Numbness around the wound   Date Last Reviewed: 6/10/2015  © 4352-9471 [employer] [facility]. [address] [place] [address]  [place] [address]. All rights reserved. This information is not intended as a substitute for professional medical care. Always follow your healthcare professional's instructions.

## 2019-01-16 NOTE — PROGRESS NOTES
Subjective:       Patient ID: Karin Smith is a 68 y.o. female.    Chief Complaint: Lip Laceration (on Sunday)    /76   Pulse 101   Temp 97.9 °F (36.6 °C) (Oral)   Resp 18   Wt 67.6 kg (149 lb 0.5 oz)   SpO2 98%   BMI 23.34 kg/m²     HPI  Fell 3 days ago, busted upper lip open and chipped upper tooth. Dentist appointment next week. Lip was swollen. But had gone down now. She has been applying neosporin ointment. Rheumatology clinic suggests her getting checked here. (+) LOC for a few seconds to minutes. Thought maybe new Bp medicine norvasc, got up too fast?  Last Tdap 11/2013    Review of Systems   Musculoskeletal: Positive for arthralgias.   Skin: Positive for wound.   Allergic/Immunologic: Positive for immunocompromised state.       Objective:      Physical Exam   Constitutional: She is oriented to person, place, and time. She appears well-developed and well-nourished. No distress.   HENT:   Head: Normocephalic and atraumatic.   Bruise on chin  Upper lip 0.5 cm laceration,  approximated by scab. No swelling, no bleeding, no deformity   Eyes: EOM are normal. Pupils are equal, round, and reactive to light.   Neurological: She is alert and oriented to person, place, and time. No cranial nerve deficit.   Skin: Skin is warm and dry. She is not diaphoretic.   Nursing note and vitals reviewed.      Assessment:       1. Lip laceration, initial encounter - wound protected by scab       Plan:     Karin was seen today for lip laceration.    Diagnoses and all orders for this visit:    Lip laceration, initial encounter - no need for other intervention      continue neosporin ointment on wound  Follow up with PCP re frequent falls

## 2019-01-16 NOTE — PATIENT INSTRUCTIONS
Ixekizumab injection  What is this medicine?  IXEKIZUMAB (ix e KIZ ue mab) is a monoclonal antibody. It is used to treat psoriasis.  How should I use this medicine?  This medicine is for injection under the skin. It may be administered by a healthcare professional in a hospital or clinic setting or at home. If you get this medicine at home, you will be taught how to prepare and give this medicine. Use exactly as directed. Take your medicine at regular intervals. Do not take your medicine more often than directed.  It is important that you put your used needles and syringes in a special sharps container. Do not put them in a trash can. If you do not have a sharps container, call your pharmacist or healthcare provider to get one.  A special MedGuide will be given to you by the pharmacist with each prescription and refill. Be sure to read this information carefully each time.  Talk to your pediatrician regarding the use of this medicine in children. Special care may be needed.  What side effects may I notice from receiving this medicine?  Side effects that you should report to your doctor or health care professional as soon as possible:  · allergic reactions like skin rash, itching or hives, swelling of the face, lips, or tongue  · signs and symptoms of infection like fever or chills; cough; sore throat; pain or trouble passing urine  · signs and symptoms of bowel problems like abdominal pain, diarrhea, blood in the stool, and weight loss  · white patches in the mouth or throat  · vaginal discharge, itching, or odor in women  Side effects that usually do not require medical attention (Report these to your doctor or health care professional if they continue or are bothersome.):  · nausea  · runny nose  · sinus trouble  What may interact with this medicine?  Do not take this medicine with any of the following medications:  · live virus vaccines  This medicine may also interact with the following  medications:  · cyclosporine  · inactivated vaccines  · warfarin  What if I miss a dose?  It is important not to miss your dose. Call your doctor of health care professional if you are unable to keep an appointment. If you give yourself the medicine and you miss a dose, take it as soon as you can. Then be sure to take your next doses on your regular schedule. Do not take double or extra doses. If you have questions about a missed injection, call your health care professional.  Where should I keep my medicine?  Keep out of the reach of children.  Store the prefilled syringe or injection pen in a refrigerator between 2 to 8 degrees C (36 to 46 degrees F). Keep the syringe or the pen in the original carton until ready for use. Protect from light. Do not freeze. Do not shake. Prior to use, remove the syringe or pen from the refrigerator and use within 30 minutes. Throw away any unused medicine after the expiration date on the label.  What should I tell my health care provider before I take this medicine?  They need to know if you have any of these conditions:  · are being treated for an infection  · have an infection that is not going away or that keeps coming back  · inflammatory bowel disease  · immune system problems  · recently received or are scheduled to receive a vaccine  · tuberculosis, a positive skin test for tuberculosis, or have recently been in close contact with someone who has tuberculosis  · an unusual or allergic reaction to ixekizumab, other medicines, foods, dyes or preservatives  · pregnant or trying to get pregnant  · breast-feeding  What should I watch for while using this medicine?  Tell your doctor or healthcare professional if your symptoms do not start to get better or if they get worse.  You will be tested for tuberculosis (TB) before you start this medicine. If your doctor prescribes any medicine for TB, you should start taking the TB medicine before starting this medicine. Make sure to  finish the full course of TB medicine.  Call your doctor or healthcare professional for advice if you get a fever, chills or sore throat, or other symptoms of a cold or flu. Do not treat yourself. This drug decreases your body's ability to fight infections. Try to avoid being around people who are sick.  This medicine can decrease the response to a vaccine. If you need to get vaccinated, tell your healthcare professional if you have received this medicine within the last 6 months. Extra booster doses may be needed. Talk to your doctor to see if a different vaccination schedule is needed.  NOTE:This sheet is a summary. It may not cover all possible information. If you have questions about this medicine, talk to your doctor, pharmacist, or health care provider. Copyright© 2017 [employer]

## 2019-01-29 ENCOUNTER — TELEPHONE (OUTPATIENT)
Dept: RHEUMATOLOGY | Facility: CLINIC | Age: 69
End: 2019-01-29

## 2019-01-29 DIAGNOSIS — L40.50 PSORIATIC ARTHRITIS: Primary | ICD-10-CM

## 2019-01-29 NOTE — TELEPHONE ENCOUNTER
Please let her know insurance denied Taltz   As discussed during visit we will try Orencia subcutaneous, every week    Script sent in

## 2019-01-29 NOTE — TELEPHONE ENCOUNTER
Called patient and informed her Taltz was declined, so Dr. Carrasco sent in a script for Orencia. Patient verbalized understanding.

## 2019-02-07 ENCOUNTER — TELEPHONE (OUTPATIENT)
Dept: RHEUMATOLOGY | Facility: CLINIC | Age: 69
End: 2019-02-07

## 2019-02-19 ENCOUNTER — TELEPHONE (OUTPATIENT)
Dept: RHEUMATOLOGY | Facility: CLINIC | Age: 69
End: 2019-02-19

## 2019-02-19 NOTE — TELEPHONE ENCOUNTER
----- Message from Susan Seymour LPN sent at 2/19/2019  2:43 PM CST -----  Contact: [employer]       ----- Message -----  From: Lucy Swenson  Sent: 2/19/2019  12:41 PM  To: [facility] Staff    Caller needs call back rg prior authorization for patient medication [phone]

## 2019-02-19 NOTE — TELEPHONE ENCOUNTER
Called [facility] back but they was unable to find patient info in the system. Patient does not need mediation so no prior auth is needed.

## 2019-04-17 ENCOUNTER — OFFICE VISIT (OUTPATIENT)
Dept: RHEUMATOLOGY | Facility: CLINIC | Age: 69
End: 2019-04-17
Payer: MEDICARE

## 2019-04-17 ENCOUNTER — LAB VISIT (OUTPATIENT)
Dept: LAB | Facility: HOSPITAL | Age: 69
End: 2019-04-17
Attending: INTERNAL MEDICINE
Payer: MEDICARE

## 2019-04-17 VITALS
DIASTOLIC BLOOD PRESSURE: 96 MMHG | HEIGHT: 67 IN | HEART RATE: 75 BPM | SYSTOLIC BLOOD PRESSURE: 145 MMHG | BODY MASS INDEX: 23.11 KG/M2 | WEIGHT: 147.25 LBS

## 2019-04-17 DIAGNOSIS — L40.50 PSORIATIC ARTHRITIS: Primary | ICD-10-CM

## 2019-04-17 DIAGNOSIS — M54.2 NECK PAIN: ICD-10-CM

## 2019-04-17 DIAGNOSIS — L40.9 PSORIASIS: ICD-10-CM

## 2019-04-17 DIAGNOSIS — L40.50 PSORIATIC ARTHRITIS: ICD-10-CM

## 2019-04-17 LAB
ALBUMIN SERPL BCP-MCNC: 3.7 G/DL (ref 3.5–5.2)
ALP SERPL-CCNC: 70 U/L (ref 55–135)
ALT SERPL W/O P-5'-P-CCNC: 13 U/L (ref 10–44)
ANION GAP SERPL CALC-SCNC: 9 MMOL/L (ref 8–16)
AST SERPL-CCNC: 21 U/L (ref 10–40)
BASOPHILS # BLD AUTO: 0.03 K/UL (ref 0–0.2)
BASOPHILS NFR BLD: 0.5 % (ref 0–1.9)
BILIRUB SERPL-MCNC: 0.4 MG/DL (ref 0.1–1)
BUN SERPL-MCNC: 16 MG/DL (ref 8–23)
CALCIUM SERPL-MCNC: 9.7 MG/DL (ref 8.7–10.5)
CHLORIDE SERPL-SCNC: 100 MMOL/L (ref 95–110)
CO2 SERPL-SCNC: 27 MMOL/L (ref 23–29)
CREAT SERPL-MCNC: 0.9 MG/DL (ref 0.5–1.4)
CRP SERPL-MCNC: 2.6 MG/L (ref 0–8.2)
DIFFERENTIAL METHOD: NORMAL
EOSINOPHIL # BLD AUTO: 0.1 K/UL (ref 0–0.5)
EOSINOPHIL NFR BLD: 1.3 % (ref 0–8)
ERYTHROCYTE [DISTWIDTH] IN BLOOD BY AUTOMATED COUNT: 14.2 % (ref 11.5–14.5)
ERYTHROCYTE [SEDIMENTATION RATE] IN BLOOD BY WESTERGREN METHOD: 7 MM/HR (ref 0–36)
EST. GFR  (AFRICAN AMERICAN): >60 ML/MIN/1.73 M^2
EST. GFR  (NON AFRICAN AMERICAN): >60 ML/MIN/1.73 M^2
GLUCOSE SERPL-MCNC: 88 MG/DL (ref 70–110)
HCT VFR BLD AUTO: 40.5 % (ref 37–48.5)
HGB BLD-MCNC: 13.3 G/DL (ref 12–16)
IMM GRANULOCYTES # BLD AUTO: 0.02 K/UL (ref 0–0.04)
IMM GRANULOCYTES NFR BLD AUTO: 0.3 % (ref 0–0.5)
LYMPHOCYTES # BLD AUTO: 1.9 K/UL (ref 1–4.8)
LYMPHOCYTES NFR BLD: 32.3 % (ref 18–48)
MCH RBC QN AUTO: 30.6 PG (ref 27–31)
MCHC RBC AUTO-ENTMCNC: 32.8 G/DL (ref 32–36)
MCV RBC AUTO: 93 FL (ref 82–98)
MONOCYTES # BLD AUTO: 0.4 K/UL (ref 0.3–1)
MONOCYTES NFR BLD: 7 % (ref 4–15)
NEUTROPHILS # BLD AUTO: 3.5 K/UL (ref 1.8–7.7)
NEUTROPHILS NFR BLD: 58.9 % (ref 38–73)
NRBC BLD-RTO: 0 /100 WBC
PLATELET # BLD AUTO: 224 K/UL (ref 150–350)
PMV BLD AUTO: 10 FL (ref 9.2–12.9)
POTASSIUM SERPL-SCNC: 4.3 MMOL/L (ref 3.5–5.1)
PROT SERPL-MCNC: 7.4 G/DL (ref 6–8.4)
RBC # BLD AUTO: 4.35 M/UL (ref 4–5.4)
SODIUM SERPL-SCNC: 136 MMOL/L (ref 136–145)
WBC # BLD AUTO: 6.01 K/UL (ref 3.9–12.7)

## 2019-04-17 PROCEDURE — 85652 RBC SED RATE AUTOMATED: CPT

## 2019-04-17 PROCEDURE — 1101F PT FALLS ASSESS-DOCD LE1/YR: CPT | Mod: CPTII,S$GLB,, | Performed by: INTERNAL MEDICINE

## 2019-04-17 PROCEDURE — 1101F PR PT FALLS ASSESS DOC 0-1 FALLS W/OUT INJ PAST YR: ICD-10-PCS | Mod: CPTII,S$GLB,, | Performed by: INTERNAL MEDICINE

## 2019-04-17 PROCEDURE — 99999 PR PBB SHADOW E&M-EST. PATIENT-LVL III: ICD-10-PCS | Mod: PBBFAC,,, | Performed by: INTERNAL MEDICINE

## 2019-04-17 PROCEDURE — 85025 COMPLETE CBC W/AUTO DIFF WBC: CPT

## 2019-04-17 PROCEDURE — 80053 COMPREHEN METABOLIC PANEL: CPT

## 2019-04-17 PROCEDURE — 36415 COLL VENOUS BLD VENIPUNCTURE: CPT

## 2019-04-17 PROCEDURE — 99214 PR OFFICE/OUTPT VISIT, EST, LEVL IV, 30-39 MIN: ICD-10-PCS | Mod: S$GLB,,, | Performed by: INTERNAL MEDICINE

## 2019-04-17 PROCEDURE — 99999 PR PBB SHADOW E&M-EST. PATIENT-LVL III: CPT | Mod: PBBFAC,,, | Performed by: INTERNAL MEDICINE

## 2019-04-17 PROCEDURE — 86140 C-REACTIVE PROTEIN: CPT

## 2019-04-17 PROCEDURE — 99214 OFFICE O/P EST MOD 30 MIN: CPT | Mod: S$GLB,,, | Performed by: INTERNAL MEDICINE

## 2019-04-17 NOTE — PROGRESS NOTES
CC:  Chief Complaint   Patient presents with    Psoriatic arthritis       History of Present Illness:  Karin laura 68 y.o.yo female   Patient Active Problem List   Diagnosis    Psoriatic arthritis    Hypothyroid    Hypovitaminosis D    Fibromyalgia    Back pain    Psoriasis    Degenerative arthritis of cervical spine    Reflux    Neck pain    High risk medication use    Immunocompromised     Here for follow-up of psoriasis and psoriatic arthritis  Since last visit she has been off all biologics  She was supposed to start Orencia but insurance declined  Insurance declined taltz as  Well  Then we decided to go back to cosentyx and give it another try  However she never got the medication  She continued to do well off all medications since last visit.  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  She was given injections in the past which helped some  She still continues to have occasional pain in right shoulder when she tries to lift her arm up, but this is not bothersome    Today she feels she is doing well  She wants to see how she is going to do without any medications  She is also on a strict diet and avoids red meats sugars  She also lost weight  She is feeling good overall      She failed numerous medications   Review of old records indicate she failed multiple prior medications including Enbrel, Cimzia, Remicade, Simponi, Stelara  GI upset with methotrexate  Local injection site reaction to Humira    She was  doing a little better on Humira but her concern is local injection site reaction to Humira  She has local site reaction which  lasts about 7-10 days and then resolves    Otherwise denies any fever, chills, weight loss  Neck pain has been stable  Not needing tramadol       History  She also mentions of neck pain, she has chronic neck pain had a fusion surgery in the past many years ago in [place]   Received ILENE in the past  Neck continues to bother her with radiation to the shoulder and right arm, with intermittent tingling  She received a right shoulder injection few months back and this helped for few weeks  X-ray was suggestive of AC and G-H joint arthritis    Has taken tramadol in the past which has helped  Denies any feet pain or swelling  Pain in the hands worse both in a.m. and also aggravated with any activity and touch  Stop methotrexate as she did not like the side effects mentioned in literature  Was intolerant of gabapentin in the past as well    She feels cosentyx  is not working, feels Humira in the past work better for her  She would like to give it a try again    Past Medical History:   Diagnosis Date    Acid reflux     Allergy     Arthritis     Degenerative disc disease     Thyroid disease        Past Surgical History:   Procedure Laterality Date     SECTION      HYSTERECTOMY      TONSILLECTOMY           Social History     Tobacco Use    Smoking status: Former Smoker    Smokeless tobacco: Never Used   Substance Use Topics    Alcohol use: Yes    Drug use: No       Family History   Problem Relation Age of Onset    Cancer Mother     Cancer Father     Heart disease Father     Kidney disease Father     Cancer Brother     Cancer Brother     Diabetes Mellitus Brother        Review of patient's allergies indicates:   Allergen Reactions    Benadryl [diphenhydramine hcl]              Review of Systems:  Constitutional: Denies fever, chills. No recent weight changes.   Fatigue: no   Muscle weakness: no  Headaches: no new headaches  Eyes: No redness or dryness.  No recent visual changes.  ENT: Denies dry  mouth. No oral or nasal ulcers.  Card: No chest pain.  Resp: No cough or sob.   Gastro: No nausea or vomiting.  No heartburn.  Constipation: no  Diarrhea: no  Genito:  No dysuria.  No genital ulcers.  Skin: No rash.  Raynauds:no  Neuro: No numbness / tingling.   Psych: No depression, anxiety  Endo:  no excess thirst.  Heme: no abnormal bleeding or bruising  Clots:none   Miscarriages :     OBJECTIVE:     Vital Signs   Vitals:    04/17/19 0958   BP: (!) 145/96   Pulse: 75     Physical Exam:  General Appearance:  NAD.   Gait: not favoring.  HEENT: PERRL.  Eyes not dry or injected.  No nasal ulcers.  Mouth not dry, no oral lesions.  Lymph: cervical, supraclavicular or axillary nodes: none abnormal   Cardio: no irregularity of S1 or S2.  No gallops or rubs.   Resp: Normal respiratory motion. Clear to auscultation bilaterally.   No abnormal chest conformation.  Abd: Soft, non-tender, nondistended.  No masses.   Skin: Head and neck,  and extremities examined.  No rash   Neuro: Ox3.   Cranial nerves II-XII grossly intact.   Sensation intact  in both distal LE and upper extremities to light touch.  Musculoskeletal Exam:  B/l hands chronic deformities  Enlarged prominent PIP/DIP    Rt shoulder:  Tenderness over AC joint, subacromial tenderness present  Pain on external rotation and overhead abduction    Spine :  Cervical and lumbar facet tenderness    Tender points:  Muscle strength:Equal and full in all mm groups of the upper and lower ext.    Laboratory:   Results for orders placed or performed in visit on 04/17/19   Comprehensive metabolic panel   Result Value Ref Range    Sodium 136 136 - 145 mmol/L    Potassium 4.3 3.5 - 5.1 mmol/L    Chloride 100 95 - 110 mmol/L    CO2 27 23 - 29 mmol/L    Glucose 88 70 - 110 mg/dL    BUN, Bld 16 8 - 23 mg/dL    Creatinine 0.9 0.5 - 1.4 mg/dL    Calcium 9.7 8.7 - 10.5 mg/dL    Total Protein 7.4 6.0 - 8.4 g/dL    Albumin 3.7 3.5 - 5.2 g/dL    Total Bilirubin 0.4 0.1 - 1.0 mg/dL     Alkaline Phosphatase 70 55 - 135 U/L    AST 21 10 - 40 U/L    ALT 13 10 - 44 U/L    Anion Gap 9 8 - 16 mmol/L    eGFR if African American >60 >60 mL/min/1.73 m^2    eGFR if non African American >60 >60 mL/min/1.73 m^2   C-reactive protein   Result Value Ref Range    CRP 2.6 0.0 - 8.2 mg/L   CBC auto differential   Result Value Ref Range    WBC 6.01 3.90 - 12.70 K/uL    RBC 4.35 4.00 - 5.40 M/uL    Hemoglobin 13.3 12.0 - 16.0 g/dL    Hematocrit 40.5 37.0 - 48.5 %    MCV 93 82 - 98 fL    MCH 30.6 27.0 - 31.0 pg    MCHC 32.8 32.0 - 36.0 g/dL    RDW 14.2 11.5 - 14.5 %    Platelets 224 150 - 350 K/uL    MPV 10.0 9.2 - 12.9 fL    Immature Granulocytes 0.3 0.0 - 0.5 %    Gran # (ANC) 3.5 1.8 - 7.7 K/uL    Immature Grans (Abs) 0.02 0.00 - 0.04 K/uL    Lymph # 1.9 1.0 - 4.8 K/uL    Mono # 0.4 0.3 - 1.0 K/uL    Eos # 0.1 0.0 - 0.5 K/uL    Baso # 0.03 0.00 - 0.20 K/uL    nRBC 0 0 /100 WBC    Gran% 58.9 38.0 - 73.0 %    Lymph% 32.3 18.0 - 48.0 %    Mono% 7.0 4.0 - 15.0 %    Eosinophil% 1.3 0.0 - 8.0 %    Basophil% 0.5 0.0 - 1.9 %    Differential Method Automated      Imaging :  X-ray hands    Findings: No acute fractures or dislocations visualized. Fairly extensive multi-articular arthritic findings throughout the bilateral hands and wrists with associated chronic erosive findings appear unchanged from prior.  Findings predominantly involve the IP joints as well as the bilateral 1st CMC joints. No new erosive changes appreciated.        X-ray shoulder:    FINDINGS:  There is no radiographic evidence of acute osseous, articular, or soft tissue abnormality.  There are moderate degenerative findings present at the AC and glenohumeral joints.      Impression       As above.  No acute findings         Notes reviewed  Other procedures:    ASSESSMENT/PLAN:     Psoriatic arthritis    Psoriasis    Neck pain      1:  Psoriatic arthritis:    Failed multiple medications in the past as detailed above  Uses ibuprofen/ Mobic p.r.n.  Currently  doing well and last 3 months off biologics  Normal ESR/CRP  She prefers to continue to monitor herself off medications  If she notices any flare she would give me a call back  We will call her a script for cosentyx then   Otherwise continue with turmeric  Ibuprofen/Mobic as needed  Up-to-date on immunizations except zoster     2:  Chronic neck pain:  Stable     3:  Right shoulder AC/GH arthritis:  Stable  Use ibuprofen or Mobic as needed    4:HM :  Last DEXA  done at primary care office  Results not available  Recommend she gets me the results next visit  Otherwise continue with vitamin-D 1000 units a day      3 month return, with labs      Risks vs Benefits and potential side effects of medication prescribed today were discussed with patient. Medication literature given to patient up discharge  Went over uptodate information /literature on the meds prescribed today      Infection, malignancy, demyelinating disease risk discussed  She is aware to hold the medication for any infection or prior to surgery    Disclaimer: This note was prepared using voice recognition system and is likely to have sound alike errors and is not proof read.  Please call me with any questions.